# Patient Record
Sex: MALE | Race: BLACK OR AFRICAN AMERICAN | URBAN - METROPOLITAN AREA
[De-identification: names, ages, dates, MRNs, and addresses within clinical notes are randomized per-mention and may not be internally consistent; named-entity substitution may affect disease eponyms.]

---

## 2024-11-11 ENCOUNTER — LAB ENCOUNTER (OUTPATIENT)
Dept: LAB | Age: 29
End: 2024-11-11
Attending: INTERNAL MEDICINE
Payer: COMMERCIAL

## 2024-11-11 DIAGNOSIS — Z13.0 SCREENING FOR ENDOCRINE, NUTRITIONAL, METABOLIC AND IMMUNITY DISORDER: Primary | ICD-10-CM

## 2024-11-11 DIAGNOSIS — E55.9 VITAMIN D DEFICIENCY: ICD-10-CM

## 2024-11-11 DIAGNOSIS — Z13.29 SCREENING FOR ENDOCRINE, NUTRITIONAL, METABOLIC AND IMMUNITY DISORDER: Primary | ICD-10-CM

## 2024-11-11 DIAGNOSIS — Z13.228 SCREENING FOR ENDOCRINE, NUTRITIONAL, METABOLIC AND IMMUNITY DISORDER: Primary | ICD-10-CM

## 2024-11-11 DIAGNOSIS — Z13.21 SCREENING FOR ENDOCRINE, NUTRITIONAL, METABOLIC AND IMMUNITY DISORDER: Primary | ICD-10-CM

## 2024-11-11 LAB
ALBUMIN SERPL-MCNC: 4.8 G/DL (ref 3.2–4.8)
ALBUMIN/GLOB SERPL: 1.5 {RATIO} (ref 1–2)
ALP LIVER SERPL-CCNC: 97 U/L
ALT SERPL-CCNC: 35 U/L
ANION GAP SERPL CALC-SCNC: 10 MMOL/L (ref 0–18)
AST SERPL-CCNC: 19 U/L (ref ?–34)
BILIRUB SERPL-MCNC: 0.6 MG/DL (ref 0.3–1.2)
BUN BLD-MCNC: 11 MG/DL (ref 9–23)
BUN/CREAT SERPL: 13.8 (ref 10–20)
CALCIUM BLD-MCNC: 10.2 MG/DL (ref 8.7–10.4)
CHLORIDE SERPL-SCNC: 104 MMOL/L (ref 98–112)
CHOLEST SERPL-MCNC: 183 MG/DL (ref ?–200)
CO2 SERPL-SCNC: 26 MMOL/L (ref 21–32)
CREAT BLD-MCNC: 0.8 MG/DL
DEPRECATED RDW RBC AUTO: 34.7 FL (ref 35.1–46.3)
EGFRCR SERPLBLD CKD-EPI 2021: 122 ML/MIN/1.73M2 (ref 60–?)
ERYTHROCYTE [DISTWIDTH] IN BLOOD BY AUTOMATED COUNT: 12.3 % (ref 11–15)
EST. AVERAGE GLUCOSE BLD GHB EST-MCNC: 235 MG/DL (ref 68–126)
FASTING PATIENT LIPID ANSWER: NO
FASTING STATUS PATIENT QL REPORTED: NO
GLOBULIN PLAS-MCNC: 3.2 G/DL (ref 2–3.5)
GLUCOSE BLD-MCNC: 257 MG/DL (ref 70–99)
HBA1C MFR BLD: 9.8 % (ref ?–5.7)
HCT VFR BLD AUTO: 45.2 %
HDLC SERPL-MCNC: 34 MG/DL (ref 40–59)
HGB BLD-MCNC: 14 G/DL
LDLC SERPL CALC-MCNC: 107 MG/DL (ref ?–100)
MCH RBC QN AUTO: 24.4 PG (ref 26–34)
MCHC RBC AUTO-ENTMCNC: 31 G/DL (ref 31–37)
MCV RBC AUTO: 78.9 FL
NONHDLC SERPL-MCNC: 149 MG/DL (ref ?–130)
OSMOLALITY SERPL CALC.SUM OF ELEC: 298 MOSM/KG (ref 275–295)
PLATELET # BLD AUTO: 368 10(3)UL (ref 150–450)
POTASSIUM SERPL-SCNC: 4 MMOL/L (ref 3.5–5.1)
PROT SERPL-MCNC: 8 G/DL (ref 5.7–8.2)
RBC # BLD AUTO: 5.73 X10(6)UL
SODIUM SERPL-SCNC: 140 MMOL/L (ref 136–145)
TRIGL SERPL-MCNC: 243 MG/DL (ref 30–149)
TSI SER-ACNC: 1.47 UIU/ML (ref 0.55–4.78)
VIT D+METAB SERPL-MCNC: 14.9 NG/ML (ref 30–100)
VLDLC SERPL CALC-MCNC: 42 MG/DL (ref 0–30)
WBC # BLD AUTO: 7.7 X10(3) UL (ref 4–11)

## 2024-11-11 PROCEDURE — 80053 COMPREHEN METABOLIC PANEL: CPT | Performed by: INTERNAL MEDICINE

## 2024-11-11 PROCEDURE — 84466 ASSAY OF TRANSFERRIN: CPT | Performed by: INTERNAL MEDICINE

## 2024-11-11 PROCEDURE — 36415 COLL VENOUS BLD VENIPUNCTURE: CPT | Performed by: INTERNAL MEDICINE

## 2024-11-11 PROCEDURE — 84443 ASSAY THYROID STIM HORMONE: CPT | Performed by: INTERNAL MEDICINE

## 2024-11-11 PROCEDURE — 83036 HEMOGLOBIN GLYCOSYLATED A1C: CPT | Performed by: INTERNAL MEDICINE

## 2024-11-11 PROCEDURE — 83540 ASSAY OF IRON: CPT | Performed by: INTERNAL MEDICINE

## 2024-11-11 PROCEDURE — 85027 COMPLETE CBC AUTOMATED: CPT | Performed by: INTERNAL MEDICINE

## 2024-11-11 PROCEDURE — 82306 VITAMIN D 25 HYDROXY: CPT | Performed by: INTERNAL MEDICINE

## 2024-11-11 PROCEDURE — 80061 LIPID PANEL: CPT | Performed by: INTERNAL MEDICINE

## 2024-11-11 PROCEDURE — 82728 ASSAY OF FERRITIN: CPT | Performed by: INTERNAL MEDICINE

## 2024-11-12 ENCOUNTER — TELEPHONE (OUTPATIENT)
Facility: LOCATION | Age: 29
End: 2024-11-12

## 2024-11-12 ENCOUNTER — OFFICE VISIT (OUTPATIENT)
Facility: LOCATION | Age: 29
End: 2024-11-12

## 2024-11-12 VITALS
HEIGHT: 74 IN | BODY MASS INDEX: 36.83 KG/M2 | DIASTOLIC BLOOD PRESSURE: 68 MMHG | WEIGHT: 287 LBS | SYSTOLIC BLOOD PRESSURE: 142 MMHG | OXYGEN SATURATION: 98 % | HEART RATE: 78 BPM

## 2024-11-12 DIAGNOSIS — Z53.20 STATIN MEDICATION DECLINED BY PATIENT: ICD-10-CM

## 2024-11-12 DIAGNOSIS — R71.8 LOW MEAN CORPUSCULAR VOLUME (MCV): ICD-10-CM

## 2024-11-12 DIAGNOSIS — E11.9 TYPE 2 DIABETES MELLITUS WITHOUT COMPLICATION, WITHOUT LONG-TERM CURRENT USE OF INSULIN (HCC): ICD-10-CM

## 2024-11-12 DIAGNOSIS — R06.81 WITNESSED EPISODE OF APNEA: ICD-10-CM

## 2024-11-12 DIAGNOSIS — E11.59 HYPERTENSION ASSOCIATED WITH TYPE 2 DIABETES MELLITUS (HCC): ICD-10-CM

## 2024-11-12 DIAGNOSIS — E11.69 HYPERLIPIDEMIA ASSOCIATED WITH TYPE 2 DIABETES MELLITUS (HCC): ICD-10-CM

## 2024-11-12 DIAGNOSIS — I15.2 HYPERTENSION ASSOCIATED WITH TYPE 2 DIABETES MELLITUS (HCC): ICD-10-CM

## 2024-11-12 DIAGNOSIS — Z00.00 ANNUAL PHYSICAL EXAM: Primary | ICD-10-CM

## 2024-11-12 DIAGNOSIS — E78.5 HYPERLIPIDEMIA ASSOCIATED WITH TYPE 2 DIABETES MELLITUS (HCC): ICD-10-CM

## 2024-11-12 DIAGNOSIS — J45.41 MODERATE PERSISTENT ASTHMA WITH ACUTE EXACERBATION (HCC): ICD-10-CM

## 2024-11-12 PROBLEM — J45.40 MODERATE PERSISTENT ASTHMA WITHOUT COMPLICATION (HCC): Status: ACTIVE | Noted: 2024-11-12

## 2024-11-12 PROBLEM — M79.10 MYALGIA: Status: ACTIVE | Noted: 2024-11-12

## 2024-11-12 PROBLEM — R79.89 ELEVATED FERRITIN: Status: ACTIVE | Noted: 2024-11-12

## 2024-11-12 LAB
DEPRECATED HBV CORE AB SER IA-ACNC: 423 NG/ML
IRON SATN MFR SERPL: 33 %
IRON SERPL-MCNC: 128 UG/DL
TIBC SERPL-MCNC: 387 UG/DL (ref 250–425)
TRANSFERRIN SERPL-MCNC: 260 MG/DL (ref 215–365)

## 2024-11-12 PROCEDURE — 3078F DIAST BP <80 MM HG: CPT | Performed by: INTERNAL MEDICINE

## 2024-11-12 PROCEDURE — 3077F SYST BP >= 140 MM HG: CPT | Performed by: INTERNAL MEDICINE

## 2024-11-12 PROCEDURE — 3046F HEMOGLOBIN A1C LEVEL >9.0%: CPT | Performed by: INTERNAL MEDICINE

## 2024-11-12 PROCEDURE — 3008F BODY MASS INDEX DOCD: CPT | Performed by: INTERNAL MEDICINE

## 2024-11-12 PROCEDURE — 99385 PREV VISIT NEW AGE 18-39: CPT | Performed by: INTERNAL MEDICINE

## 2024-11-12 PROCEDURE — 99204 OFFICE O/P NEW MOD 45 MIN: CPT | Performed by: INTERNAL MEDICINE

## 2024-11-12 RX ORDER — TIRZEPATIDE 2.5 MG/.5ML
2.5 INJECTION, SOLUTION SUBCUTANEOUS WEEKLY
Qty: 2 ML | Refills: 1 | Status: SHIPPED | OUTPATIENT
Start: 2024-11-12 | End: 2024-11-14

## 2024-11-12 RX ORDER — FLUTICASONE PROPIONATE AND SALMETEROL 250; 50 UG/1; UG/1
1 POWDER RESPIRATORY (INHALATION) 2 TIMES DAILY
Qty: 3 EACH | Refills: 6 | Status: SHIPPED | OUTPATIENT
Start: 2024-11-12

## 2024-11-12 RX ORDER — TELMISARTAN 20 MG/1
20 TABLET ORAL NIGHTLY
Qty: 90 TABLET | Refills: 5 | Status: SHIPPED | OUTPATIENT
Start: 2024-11-12

## 2024-11-12 RX ORDER — TIRZEPATIDE 7.5 MG/.5ML
7.5 INJECTION, SOLUTION SUBCUTANEOUS WEEKLY
Qty: 2 ML | Refills: 1 | Status: SHIPPED | OUTPATIENT
Start: 2024-11-12

## 2024-11-12 RX ORDER — TIRZEPATIDE 5 MG/.5ML
5 INJECTION, SOLUTION SUBCUTANEOUS WEEKLY
Qty: 2 ML | Refills: 1 | Status: SHIPPED | OUTPATIENT
Start: 2024-11-12

## 2024-11-12 RX ORDER — METHYLPREDNISOLONE 4 MG/1
TABLET ORAL
Qty: 1 EACH | Refills: 0 | Status: SHIPPED | OUTPATIENT
Start: 2024-11-12

## 2024-11-12 RX ORDER — METFORMIN HYDROCHLORIDE 500 MG/1
1000 TABLET, EXTENDED RELEASE ORAL 2 TIMES DAILY WITH MEALS
Qty: 360 TABLET | Refills: 0 | Status: SHIPPED | OUTPATIENT
Start: 2024-11-12 | End: 2025-02-10

## 2024-11-12 RX ORDER — ROSUVASTATIN CALCIUM 5 MG/1
5 TABLET, COATED ORAL NIGHTLY
Qty: 90 TABLET | Refills: 6 | Status: SHIPPED | OUTPATIENT
Start: 2024-11-12 | End: 2024-11-12

## 2024-11-12 NOTE — PROGRESS NOTES
INTERNAL MEDICINE ANNUAL EXAM NOTE     Patient ID: Deirdre Lewis is a 29 year old male.  Chief Complaint: Physical      Deirdre Lewis is a pleasant 29 year old male who presents for annual physical exam. Deirdre Lewis is doing well today.  1.  He has type 2 diabetes, his most recent A1c is elevated at 9.8, he is currently on metformin and Jardiance.  2.  He has hypertension associate with type 2 diabetes, he is not currently on any medications, we discussed starting telmisartan.  3.  He has dyslipidemia associated type 2 diabetes, discussed the importance of primary prevention, with that being said he is in the process of having children and there are reports that cholesterol medications can alter sperm quality therefore he elects not to start a cholesterol medication at this time.  4.  He has been having some wheeze and cough, this started after an upper respiratory illness a few weeks ago.  He states his symptoms are worse at night.  He does not have any fevers.  There is no mucus but he feels like mucus is coming up.  5.  He states that he snores heavily at night, his wife states that he stops breathing at night, he often wakes up fatigued and with headaches.  6.  His labs showed low MCV, unclear if this is a thalassemia or if this is truly iron deficiency, he does feel some fatigue.        Health Maintenance  - All care gaps addressed with patient.     Review of Systems  Review of Systems   Constitutional:  Negative for unexpected weight change.   HENT:  Negative for trouble swallowing.    Eyes:  Negative for pain.   Cardiovascular:  Negative for chest pain.   Gastrointestinal:  Negative for abdominal pain.   Genitourinary:  Negative for dysuria.   Neurological: Negative.    Psychiatric/Behavioral: Negative.         Physical Exam  Vitals:    11/12/24 0822   BP: 142/68   Pulse: 78   SpO2: 98%   Weight: 287 lb (130.2 kg)   Height: 6' 2\" (1.88 m)     Body mass index is 36.85 kg/m².  BP Readings from Last 3 Encounters:    11/12/24 142/68     Physical Exam  Vitals and nursing note reviewed.   Constitutional:       General: He is not in acute distress.     Appearance: Normal appearance.   HENT:      Head: Normocephalic.      Right Ear: External ear normal.      Left Ear: External ear normal.   Eyes:      Extraocular Movements: Extraocular movements intact.      Conjunctiva/sclera: Conjunctivae normal.   Cardiovascular:      Rate and Rhythm: Normal rate and regular rhythm.      Pulses: Normal pulses.      Heart sounds: Normal heart sounds.   Pulmonary:      Effort: Pulmonary effort is normal. No respiratory distress.      Breath sounds: Normal breath sounds. No wheezing.   Abdominal:      General: Abdomen is flat. Bowel sounds are normal.      Tenderness: There is no abdominal tenderness.   Musculoskeletal:         General: Normal range of motion.      Cervical back: Normal range of motion and neck supple.   Skin:     Coloration: Skin is not jaundiced.   Neurological:      General: No focal deficit present.      Mental Status: He is alert and oriented to person, place, and time. Mental status is at baseline.   Psychiatric:         Mood and Affect: Mood normal.         Behavior: Behavior normal.           Labs & Imaging  Pertinent labs and imaging reviewed.   Lab Results   Component Value Date     (H) 11/11/2024    BUN 11 11/11/2024    BUNCREA 13.8 11/11/2024    CREATSERUM 0.80 11/11/2024    ANIONGAP 10 11/11/2024    CA 10.2 11/11/2024    OSMOCALC 298 (H) 11/11/2024    ALKPHO 97 11/11/2024    AST 19 11/11/2024    ALT 35 11/11/2024    BILT 0.6 11/11/2024    TP 8.0 11/11/2024    ALB 4.8 11/11/2024    GLOBULIN 3.2 11/11/2024     11/11/2024    K 4.0 11/11/2024     11/11/2024    CO2 26.0 11/11/2024     Lab Results   Component Value Date     (H) 11/11/2024    A1C 9.8 (H) 11/11/2024     Lab Results   Component Value Date    WBC 7.7 11/11/2024    RBC 5.73 (H) 11/11/2024    HGB 14.0 11/11/2024    HCT 45.2 11/11/2024     MCV 78.9 (L) 11/11/2024    MCH 24.4 (L) 11/11/2024    MCHC 31.0 11/11/2024    RDW 12.3 11/11/2024    .0 11/11/2024     Lab Results   Component Value Date    CHOLEST 183 11/11/2024    TRIG 243 (H) 11/11/2024    HDL 34 (L) 11/11/2024     (H) 11/11/2024    VLDL 42 (H) 11/11/2024    NONHDLC 149 (H) 11/11/2024     The ASCVD Risk score (Juventino JAIN, et al., 2019) failed to calculate for the following reasons:    The 2019 ASCVD risk score is only valid for ages 40 to 79    Medical History    Reviewed allergies:  Allergies[1]     Reviewed:  Patient Active Problem List    Diagnosis    Type 2 diabetes mellitus without complication, without long-term current use of insulin (HCC)    Hypertension associated with type 2 diabetes mellitus (HCC)    Hyperlipidemia associated with type 2 diabetes mellitus (HCC)    Moderate persistent asthma without complication (HCC)    Statin medication declined by patient    Myalgia      Reviewed:  History reviewed. No pertinent past medical history.   Reviewed:  History reviewed. No pertinent family history.    Reviewed:  History reviewed. No pertinent surgical history.   Reviewed:  Social History     Socioeconomic History    Marital status:    Tobacco Use    Smoking status: Never    Smokeless tobacco: Never     Social Drivers of Health     Financial Resource Strain: Not on File (4/2/2024)    Received from [facility]    Financial Resource Strain     Financial Resource Strain: 0   Food Insecurity: Not on File (9/26/2024)    Received from [facility]    Food Insecurity     Food: 0   Transportation Needs: Not on File (4/2/2024)    Received from [facility]    Transportation Needs     Transportation: 0   Physical Activity: Not on File (4/2/2024)    Received from [facility]    Physical Activity     Physical Activity: 0   Stress: Not on File (4/2/2024)    Received from [facility]    Stress     Stress: 0   Social Connections: Not on File (9/16/2024)    Received from [facility]    Social Connections     Connectedness:  0   Housing Stability: Not on File (2024)    Received from [facility]     Housin      Reviewed:  Current Outpatient Medications   Medication Sig Dispense Refill    empagliflozin (JARDIANCE) 25 MG Oral Tab Take 1 tablet (25 mg total) by mouth daily. FOR DIABETES. 90 tablet 9    metFORMIN  MG Oral Tablet 24 Hr Take 2 tablets (1,000 mg total) by mouth 2 (two) times daily with meals. 360 tablet 0    Tirzepatide (MOUNJARO) 2.5 MG/0.5ML Subcutaneous Solution Auto-injector Inject 2.5 mg into the skin once a week. If doing well after 4 doses then increase to 5mg. 2 mL 1    Tirzepatide (MOUNJARO) 5 MG/0.5ML Subcutaneous Solution Auto-injector Inject 5 mg into the skin once a week. If doing well after 4 doses then increase to 7.5mg. 2 mL 1    Tirzepatide (MOUNJARO) 7.5 MG/0.5ML Subcutaneous Solution Auto-injector Inject 7.5 mg into the skin once a week. See me back for refills/labs. 2 mL 1    Telmisartan 20 MG Oral Tab Take 1 tablet (20 mg total) by mouth at bedtime. FOR BLOOD PRESSURE. STOP LOSARTAN. 90 tablet 5    fluticasone-salmeterol (ADVAIR DISKUS) 250-50 MCG/ACT Inhalation Aerosol Powder, Breath Activated Inhale 1 puff into the lungs 2 (two) times daily. 3 each 6    methylPREDNISolone (MEDROL) 4 MG Oral Tablet Therapy Pack As directed if symptoms fail to improve despite inhaler. 1 each 0          Assessment & Plan    1. Annual physical exam    2. Type 2 diabetes mellitus without complication, without long-term current use of insulin (HCC)  - empagliflozin (JARDIANCE) 25 MG Oral Tab; Take 1 tablet (25 mg total) by mouth daily. FOR DIABETES.  Dispense: 90 tablet; Refill: 9  - metFORMIN  MG Oral Tablet 24 Hr; Take 2 tablets (1,000 mg total) by mouth 2 (two) times daily with meals.  Dispense: 360 tablet; Refill: 0  - Tirzepatide (MOUNJARO) 2.5 MG/0.5ML Subcutaneous Solution Auto-injector; Inject 2.5 mg into the skin once a week. If doing well after 4 doses then increase to 5mg.   Dispense: 2 mL; Refill: 1  - Tirzepatide (MOUNJARO) 5 MG/0.5ML Subcutaneous Solution Auto-injector; Inject 5 mg into the skin once a week. If doing well after 4 doses then increase to 7.5mg.  Dispense: 2 mL; Refill: 1  - Tirzepatide (MOUNJARO) 7.5 MG/0.5ML Subcutaneous Solution Auto-injector; Inject 7.5 mg into the skin once a week. See me back for refills/labs.  Dispense: 2 mL; Refill: 1  - CMP in 3 months; Future  - Lipid in 3 months; Future  - Hemoglobin A1C in 3 months; Future  - Microalb/Creat Ratio, Random Urine in 3 months; Future    3. Hypertension associated with type 2 diabetes mellitus (HCC)  - Telmisartan 20 MG Oral Tab; Take 1 tablet (20 mg total) by mouth at bedtime. FOR BLOOD PRESSURE. STOP LOSARTAN.  Dispense: 90 tablet; Refill: 5    4. Hyperlipidemia associated with type 2 diabetes mellitus (HCC)    5. Low mean corpuscular volume (MCV)  - Ferritin; Future  - Iron And Tibc; Future    6. Witnessed episode of apnea  - Home Sleep Apnea Test (Adult pt only) - Sleep consult required for Medicare pts  - General sleep study; Future    7. Moderate persistent asthma with acute exacerbation (HCC)  - fluticasone-salmeterol (ADVAIR DISKUS) 250-50 MCG/ACT Inhalation Aerosol Powder, Breath Activated; Inhale 1 puff into the lungs 2 (two) times daily.  Dispense: 3 each; Refill: 6  - methylPREDNISolone (MEDROL) 4 MG Oral Tablet Therapy Pack; As directed if symptoms fail to improve despite inhaler.  Dispense: 1 each; Refill: 0    8. Statin medication declined by patient  Plan  Overall doing well today.  In terms of the asthma we will start him on Advair 2 puffs twice daily for the next week and if he has resolution no need for steroids but if he has persistence then add in the Medrol Dosepak.  Instructions in AVS.  We will start him on telmisartan for renal protection and for blood pressure.  Ideally he would benefit from cholesterol medication because of the diabetes but he is not family-planning stage therefore we  will hold off for now and he elects not to take this.  Increase the Jardiance from 10 to 25 mg, continue with metformin higher doses made him uncomfortable and with nausea, add in Mounjaro and he will titrate up.  I would like for him to repeat labs in about 3 months but we will plan to follow-up in about 1 month to check his response to the above medications.      Follow Up:   Return for 1 month video for mounjaro follow up; 3 months video/office for a1c- get labs 3 days before visit. .      Flakito Villegas MD  Internal Medicine      Patient asked to sign release of information for outside records if not already requested, make future office/imaging appointments at the  prior to leaving, and to sign up for [employer] if not already active.  Preventive measures and further education discussed with patient as per after visit summary. Potential medication side effects discussed. All questions answered to best of ability.   Call office with any questions. Seek emergency care if necessary.   Patient understands and agrees to follow directions and advice.      ----------------------------------------- PATIENT INSTRUCTIONS-----------------------------------------     Patient Instructions   Frequently asked questions about GLP-1 Medications- ZEPBOUND/WEGOVY  Eat smaller, more frequent, low carb meals to reduce side effects. In other words: eat like a diabetic! Mediterranean diet is excellent. For each meal eat fat and proteins first, then any remaining carbs- this reduces blood sugar spikes and weight gain.   IF you do not eat like a diabetic you will not lose weight and will have significant stomach side effects.  You must perform weight lifting exercises at least 3-4 times per week to ensure you build muscle. Slow repetitions to avoid injury and aim for 20-30 minutes per session of activity.   These medications make you lose fat AND muscle. The only way to come off these medications is to replace your fat with  muscle. It's not enough to just lose fat. Studies consistently show weight lifting can cure diabetes, cardiovascular exercise like walking DOES NOT. The same concept applies to weight loss.   You should eat 30-40 grams of protein daily- LEVELS protein is excellent- in addition to your meals. Can be found on [employer] If you see a kidney specialist then please discuss your protein intake with them.  Every 4 injections we should aim to increase the dose of the medication UNLESS you are having side effects or you are continuing to lose weight, then we stay at this dose for a total of 8 doses and then we re-challenge with the higher dose.  If you are losing weight but not having side effects you may remain on your current dose until your weight loss plateus, then we increase the dose.   If you are NOT losing weight AND are having side effects, continue your current dose for a total of 8 doses before we increase;  if symptoms are severe stop medication and contact my office.   You may remain on any dose indefinitely and have positive effects, but you should NOT stay on a dose that is not providing any benefit (weight loss) nor side effects (nausea/ upset stomach).   Please see me back via video visit for any questions or concerns related to increasing your dose, typically every 4-8 weeks.   Our plan is to have 1 year of medication or less!      1.  Continue with the Jardiance but we will increase this to the maximum 25 mg and for now go ahead and continue the metformin that you have at home however once you  the Mounjaro start taking it and as we start to get into the higher dosing our first goal is to simply stop the metformin.  The combination of Mounjaro plus the Jardiance will not cause low blood sugar as long as you are eating, you do not need to check your blood sugars unless you are feeling unwell, and they do not produce any major side effects as long as you are doing the above.  People who have side  effects on Mounjaro are not following a diabetic diet and otherwise they are eating lots of carbohydrates.  Weight lifting is going to be your best friend.    2.  Stop the losartan and we will switch this to telmisartan  3.  Lets place you on a cholesterol medication called rosuvastatin 5mg, this will help protect your blood vessels against the damage from diabetes and once the diabetes is controlled this is one of the first medications we can take away because you are not of 40 years of age.    4.  I will add in some iron studies to the labs you just rafa, you do not need to do anything until I tell you otherwise.    All of your medications have been sent with extended refills so please do not stop them, I will explicitly tell you if medications need to be stopped.    5. your vitamin D level is low. Please start taking over-the-counter vitamin D3 5000 IU daily with meals to help replace and maintain your Vitamin D level.     6.  For the breathing I would like for you to start Advair which is a purple Diskus, the instructions are 1 puff twice a day which is maintenance but I would like for you to do the treatment dose which is 2 puffs twice a day for 5 to 7 days and this should resolve your symptoms and then you may use the inhaler as needed.  If you feel better taking it daily you may go ahead and do so there are no side effects.  These inhalers are both treatment and prevention.  The number of puffs you take provides more treatment.  -With that being said if you take this twice a day for 5 to 7 days but you notice your symptoms have not resolved then I am going to give you a Medrol Dosepak and I would like for you to start that continue the 2 puffs twice a day until you are finished with that Medrol Dosepak and then go down to 1 puff twice a day for another 30 days.  Then let me know how you feel.             [1]   Allergies  Allergen Reactions    Dander HIVES

## 2024-11-12 NOTE — PATIENT INSTRUCTIONS
Frequently asked questions about GLP-1 Medications- ZEPBOUND/WEGOVY  Eat smaller, more frequent, low carb meals to reduce side effects. In other words: eat like a diabetic! Mediterranean diet is excellent. For each meal eat fat and proteins first, then any remaining carbs- this reduces blood sugar spikes and weight gain.   IF you do not eat like a diabetic you will not lose weight and will have significant stomach side effects.  You must perform weight lifting exercises at least 3-4 times per week to ensure you build muscle. Slow repetitions to avoid injury and aim for 20-30 minutes per session of activity.   These medications make you lose fat AND muscle. The only way to come off these medications is to replace your fat with muscle. It's not enough to just lose fat. Studies consistently show weight lifting can cure diabetes, cardiovascular exercise like walking DOES NOT. The same concept applies to weight loss.   You should eat 30-40 grams of protein daily- LEVELS protein is excellent- in addition to your meals. Can be found on [employer]. If you see a kidney specialist then please discuss your protein intake with them.  Every 4 injections we should aim to increase the dose of the medication UNLESS you are having side effects or you are continuing to lose weight, then we stay at this dose for a total of 8 doses and then we re-challenge with the higher dose.  If you are losing weight but not having side effects you may remain on your current dose until your weight loss plateus, then we increase the dose.   If you are NOT losing weight AND are having side effects, continue your current dose for a total of 8 doses before we increase;  if symptoms are severe stop medication and contact my office.   You may remain on any dose indefinitely and have positive effects, but you should NOT stay on a dose that is not providing any benefit (weight loss) nor side effects (nausea/ upset stomach).   Please see me back via video  visit for any questions or concerns related to increasing your dose, typically every 4-8 weeks.   Our plan is to have 1 year of medication or less!      1.  Continue with the Jardiance but we will increase this to the maximum 25 mg and for now go ahead and continue the metformin that you have at home however once you  the Mounjaro start taking it and as we start to get into the higher dosing our first goal is to simply stop the metformin.  The combination of Mounjaro plus the Jardiance will not cause low blood sugar as long as you are eating, you do not need to check your blood sugars unless you are feeling unwell, and they do not produce any major side effects as long as you are doing the above.  People who have side effects on Mounjaro are not following a diabetic diet and otherwise they are eating lots of carbohydrates.  Weight lifting is going to be your best friend.    2.  Stop the losartan and we will switch this to telmisartan  3.  Lets place you on a cholesterol medication called rosuvastatin 5mg, this will help protect your blood vessels against the damage from diabetes and once the diabetes is controlled this is one of the first medications we can take away because you are not of 40 years of age.    4.  I will add in some iron studies to the labs you just rafa, you do not need to do anything until I tell you otherwise.    All of your medications have been sent with extended refills so please do not stop them, I will explicitly tell you if medications need to be stopped.    5. your vitamin D level is low. Please start taking over-the-counter vitamin D3 5000 IU daily with meals to help replace and maintain your Vitamin D level.     6.  For the breathing I would like for you to start Advair which is a purple Diskus, the instructions are 1 puff twice a day which is maintenance but I would like for you to do the treatment dose which is 2 puffs twice a day for 5 to 7 days and this should resolve your  symptoms and then you may use the inhaler as needed.  If you feel better taking it daily you may go ahead and do so there are no side effects.  These inhalers are both treatment and prevention.  The number of puffs you take provides more treatment.  -With that being said if you take this twice a day for 5 to 7 days but you notice your symptoms have not resolved then I am going to give you a Medrol Dosepak and I would like for you to start that continue the 2 puffs twice a day until you are finished with that Medrol Dosepak and then go down to 1 puff twice a day for another 30 days.  Then let me know how you feel.

## 2024-11-14 DIAGNOSIS — E11.9 TYPE 2 DIABETES MELLITUS WITHOUT COMPLICATION, WITHOUT LONG-TERM CURRENT USE OF INSULIN (HCC): ICD-10-CM

## 2024-11-14 RX ORDER — TIRZEPATIDE 2.5 MG/.5ML
2.5 INJECTION, SOLUTION SUBCUTANEOUS WEEKLY
Qty: 2 ML | Refills: 1 | Status: SHIPPED | OUTPATIENT
Start: 2024-11-14

## 2024-11-23 ENCOUNTER — LAB ENCOUNTER (OUTPATIENT)
Dept: LAB | Age: 29
End: 2024-11-23
Attending: INTERNAL MEDICINE
Payer: COMMERCIAL

## 2024-11-23 DIAGNOSIS — R79.89 ELEVATED FERRITIN: ICD-10-CM

## 2024-11-23 PROCEDURE — 81256 HFE GENE: CPT

## 2024-11-23 PROCEDURE — 36415 COLL VENOUS BLD VENIPUNCTURE: CPT

## 2024-12-08 ENCOUNTER — OFFICE VISIT (OUTPATIENT)
Dept: SLEEP CENTER | Age: 29
End: 2024-12-08
Attending: INTERNAL MEDICINE
Payer: COMMERCIAL

## 2024-12-08 DIAGNOSIS — R06.81 WITNESSED EPISODE OF APNEA: ICD-10-CM

## 2024-12-08 PROCEDURE — 95806 SLEEP STUDY UNATT&RESP EFFT: CPT

## 2024-12-09 DIAGNOSIS — E11.9 TYPE 2 DIABETES MELLITUS WITHOUT COMPLICATION, WITHOUT LONG-TERM CURRENT USE OF INSULIN (HCC): ICD-10-CM

## 2024-12-12 ENCOUNTER — SLEEP STUDY (OUTPATIENT)
Facility: CLINIC | Age: 29
End: 2024-12-12
Payer: MEDICAID

## 2024-12-12 DIAGNOSIS — G47.30 SLEEP APNEA, UNSPECIFIED TYPE: Primary | ICD-10-CM

## 2024-12-12 PROCEDURE — 95806 SLEEP STUDY UNATT&RESP EFFT: CPT | Performed by: INTERNAL MEDICINE

## 2024-12-13 RX ORDER — TIRZEPATIDE 5 MG/.5ML
5 INJECTION, SOLUTION SUBCUTANEOUS WEEKLY
Qty: 2 ML | Refills: 1 | OUTPATIENT
Start: 2024-12-13

## 2025-01-07 DIAGNOSIS — E11.9 TYPE 2 DIABETES MELLITUS WITHOUT COMPLICATION, WITHOUT LONG-TERM CURRENT USE OF INSULIN (HCC): ICD-10-CM

## 2025-01-07 DIAGNOSIS — I15.2 HYPERTENSION ASSOCIATED WITH TYPE 2 DIABETES MELLITUS (HCC): ICD-10-CM

## 2025-01-07 DIAGNOSIS — E11.59 HYPERTENSION ASSOCIATED WITH TYPE 2 DIABETES MELLITUS (HCC): ICD-10-CM

## 2025-01-09 RX ORDER — TIRZEPATIDE 7.5 MG/.5ML
7.5 INJECTION, SOLUTION SUBCUTANEOUS WEEKLY
Qty: 2 ML | Refills: 1 | Status: SHIPPED | OUTPATIENT
Start: 2025-01-09

## 2025-01-09 RX ORDER — TELMISARTAN 20 MG/1
20 TABLET ORAL NIGHTLY
Qty: 90 TABLET | Refills: 5 | Status: SHIPPED | OUTPATIENT
Start: 2025-01-09

## 2025-02-06 DIAGNOSIS — E11.9 TYPE 2 DIABETES MELLITUS WITHOUT COMPLICATION, WITHOUT LONG-TERM CURRENT USE OF INSULIN (HCC): ICD-10-CM

## 2025-02-10 RX ORDER — TIRZEPATIDE 7.5 MG/.5ML
7.5 INJECTION, SOLUTION SUBCUTANEOUS WEEKLY
Qty: 2 ML | Refills: 1 | Status: SHIPPED | OUTPATIENT
Start: 2025-02-10

## 2025-02-10 NOTE — TELEPHONE ENCOUNTER
Please review. Protocol Failed; No Protocol    Future Appointments   Date Time Provider Department Center   2/17/2025 11:00 AM [facility]   2/17/2025  1:20 PM [facility]         Requested Prescriptions   Pending Prescriptions Disp Refills    Tirzepatide (MOUNJARO) 7.5 MG/0.5ML Subcutaneous Solution Auto-injector 2 mL 1     Sig: Inject 7.5 mg into the skin once a week. See me back for refills/labs.       Diabetes Medication Protocol Failed - 2/10/2025 10:39 AM        Failed - Last A1C < 7.5 and within past 6 months     Lab Results   Component Value Date    A1C 9.8 (H) 11/11/2024             Passed - In person appointment or virtual visit in the past 6 mos or appointment in next 3 mos     Recent Outpatient Visits              2 months ago [facility]ed episode of apnea    [facility]    Office Visit    3 months ago Annual physical exam    [facility] Flakito Villegas MD    Office Visit          Future Appointments         Provider Department Appt Notes    In 1 week Cristino Nuñez MD [facility] [place]     In 1 week Flakito Villegas MD [facility] f/u                    Passed - Microalbumin procedure in past 12 months or taking ACE/ARB        Passed - EGFRCR or GFRAA > 50     GFR Evaluation  EGFRCR: 122 , resulted on 11/11/2024          Passed - GFR in the past 12 months        Passed - Medication is active on med list               Future Appointments         Provider Department Appt Notes    In 1 week Cristino Nuñez MD [facility] [place]     In 1 week Flakito Villegas MD [facility] f/u          Recent Outpatient Visits              2 months ago Witnessed episode of apnea    [facility]    Office Visit    3 months ago Annual physical exam    [facility] Flakito Villegas MD    Office Visit

## 2025-02-10 NOTE — TELEPHONE ENCOUNTER
Patient called requesting an update on the following medication as the patient states they no longer have medicine left:     Medication Quantity Refills Start End   Tirzepatide (MOUNJARO) 7.5 MG/0.5ML Subcutaneous Solution Auto-injector 2 mL 1 1/9/2025 --   Sig:   Inject 7.5 mg into the skin once a week. See me back for refills/labs.     Route:   Subcutaneous     Order #:   [id number]          Pharmacy:   [facility] [address] [place] [address] [id number] AT [facility], [phone], [phone]

## 2025-02-14 ENCOUNTER — TELEPHONE (OUTPATIENT)
Facility: LOCATION | Age: 30
End: 2025-02-14

## 2025-02-14 NOTE — TELEPHONE ENCOUNTER
called pt & left vmail to ash appt w/NP Rose Pizano or another provider. Dr Villegas is no longer with the practice

## 2025-02-15 DIAGNOSIS — E11.9 TYPE 2 DIABETES MELLITUS WITHOUT COMPLICATION, WITHOUT LONG-TERM CURRENT USE OF INSULIN (HCC): ICD-10-CM

## 2025-02-17 ENCOUNTER — LAB ENCOUNTER (OUTPATIENT)
Dept: LAB | Age: 30
End: 2025-02-17
Attending: INTERNAL MEDICINE
Payer: COMMERCIAL

## 2025-02-17 ENCOUNTER — OFFICE VISIT (OUTPATIENT)
Facility: LOCATION | Age: 30
End: 2025-02-17
Payer: COMMERCIAL

## 2025-02-17 VITALS
HEART RATE: 98 BPM | DIASTOLIC BLOOD PRESSURE: 81 MMHG | BODY MASS INDEX: 35.53 KG/M2 | RESPIRATION RATE: 18 BRPM | OXYGEN SATURATION: 97 % | WEIGHT: 276.81 LBS | SYSTOLIC BLOOD PRESSURE: 149 MMHG | HEIGHT: 74 IN

## 2025-02-17 DIAGNOSIS — I15.2 HYPERTENSION ASSOCIATED WITH TYPE 2 DIABETES MELLITUS (HCC): ICD-10-CM

## 2025-02-17 DIAGNOSIS — E11.9 TYPE 2 DIABETES MELLITUS WITHOUT COMPLICATION, WITHOUT LONG-TERM CURRENT USE OF INSULIN (HCC): ICD-10-CM

## 2025-02-17 DIAGNOSIS — E11.59 HYPERTENSION ASSOCIATED WITH TYPE 2 DIABETES MELLITUS (HCC): ICD-10-CM

## 2025-02-17 DIAGNOSIS — E11.9 TYPE 2 DIABETES MELLITUS WITHOUT COMPLICATION, WITHOUT LONG-TERM CURRENT USE OF INSULIN (HCC): Primary | ICD-10-CM

## 2025-02-17 LAB
ALBUMIN SERPL-MCNC: 5.1 G/DL (ref 3.2–4.8)
ALBUMIN/GLOB SERPL: 1.8 {RATIO} (ref 1–2)
ALP LIVER SERPL-CCNC: 94 U/L
ALT SERPL-CCNC: 24 U/L
ANION GAP SERPL CALC-SCNC: 8 MMOL/L (ref 0–18)
AST SERPL-CCNC: 14 U/L (ref ?–34)
BILIRUB SERPL-MCNC: 0.5 MG/DL (ref 0.3–1.2)
BUN BLD-MCNC: 12 MG/DL (ref 9–23)
BUN/CREAT SERPL: 15.6 (ref 10–20)
CALCIUM BLD-MCNC: 10.2 MG/DL (ref 8.7–10.4)
CHLORIDE SERPL-SCNC: 103 MMOL/L (ref 98–112)
CHOLEST SERPL-MCNC: 171 MG/DL (ref ?–200)
CO2 SERPL-SCNC: 30 MMOL/L (ref 21–32)
CREAT BLD-MCNC: 0.77 MG/DL
CREAT UR-SCNC: 40.1 MG/DL
EGFRCR SERPLBLD CKD-EPI 2021: 124 ML/MIN/1.73M2 (ref 60–?)
EST. AVERAGE GLUCOSE BLD GHB EST-MCNC: 134 MG/DL (ref 68–126)
FASTING PATIENT LIPID ANSWER: NO
FASTING STATUS PATIENT QL REPORTED: NO
GLOBULIN PLAS-MCNC: 2.9 G/DL (ref 2–3.5)
GLUCOSE BLD-MCNC: 118 MG/DL (ref 70–99)
HBA1C MFR BLD: 6.3 % (ref ?–5.7)
HDLC SERPL-MCNC: 33 MG/DL (ref 40–59)
LDLC SERPL CALC-MCNC: 97 MG/DL (ref ?–100)
MICROALBUMIN UR-MCNC: <0.3 MG/DL
NONHDLC SERPL-MCNC: 138 MG/DL (ref ?–130)
OSMOLALITY SERPL CALC.SUM OF ELEC: 293 MOSM/KG (ref 275–295)
POTASSIUM SERPL-SCNC: 4.5 MMOL/L (ref 3.5–5.1)
PROT SERPL-MCNC: 8 G/DL (ref 5.7–8.2)
SODIUM SERPL-SCNC: 141 MMOL/L (ref 136–145)
TRIGL SERPL-MCNC: 237 MG/DL (ref 30–149)
VLDLC SERPL CALC-MCNC: 39 MG/DL (ref 0–30)

## 2025-02-17 PROCEDURE — 83036 HEMOGLOBIN GLYCOSYLATED A1C: CPT

## 2025-02-17 PROCEDURE — 36415 COLL VENOUS BLD VENIPUNCTURE: CPT

## 2025-02-17 PROCEDURE — 80053 COMPREHEN METABOLIC PANEL: CPT

## 2025-02-17 PROCEDURE — 82570 ASSAY OF URINE CREATININE: CPT

## 2025-02-17 PROCEDURE — 80061 LIPID PANEL: CPT

## 2025-02-17 PROCEDURE — 82043 UR ALBUMIN QUANTITATIVE: CPT

## 2025-02-17 RX ORDER — TIRZEPATIDE 10 MG/.5ML
10 INJECTION, SOLUTION SUBCUTANEOUS WEEKLY
Qty: 2 ML | Refills: 1 | Status: SHIPPED | OUTPATIENT
Start: 2025-02-17

## 2025-02-17 NOTE — PROGRESS NOTES
INTERNAL MEDICINE OFFICE NOTE     Patient ID: Deirdre Lewis is a 29 year old male.  Today's Date: 02/17/25  Chief Complaint: Establish Care (medications)    HPI  1.  Deirdre is a pleasant 29-year-old male, with history of hypertension, presents today for blood pressure follow-up.  He does not have a cuff or machine at home to check his blood pressure, blood pressure in office today was elevated at 149/81.  He denies any dizziness, headaches, chest pain.  He is prescribed telmisartan for his blood pressure, however he has not been taking this daily.  He has not taken his medication today.  He denies any other treatments prior to arrival.  2.  Patient has history of type 2 diabetes without complications without long-term use of insulin.  He has been taking Jardiance daily as prescribed, and is currently on Mounjaro 7.5 mg weekly.  Patient has been tolerating these medications well.  He denies any side effects with Mounjaro.  Tolerating p.o. well.  He has been trying to improve his diet and exercise.      Vitals:    02/17/25 1401   BP: 149/81   Pulse: 98   Resp: 18   SpO2: 97%   Weight: 276 lb 12.8 oz (125.6 kg)   Height: 6' 2\" (1.88 m)     body mass index is 35.54 kg/m².  BP Readings from Last 3 Encounters:   02/17/25 149/81   11/12/24 142/68     The ASCVD Risk score (Kistler DK, et al., 2019) failed to calculate for the following reasons:    The 2019 ASCVD risk score is only valid for ages 40 to 79  Medications reviewed:  Current Outpatient Medications   Medication Sig Dispense Refill    Tirzepatide (MOUNJARO) 10 MG/0.5ML Subcutaneous Solution Auto-injector Inject 10 mg into the skin once a week. IF DOING WELL AFTER 4 DOSES INCREASE TO 12.5MG 2 mL 1    Tirzepatide (MOUNJARO) 7.5 MG/0.5ML Subcutaneous Solution Auto-injector Inject 7.5 mg into the skin once a week. See me back for refills/labs. 2 mL 1    Telmisartan 20 MG Oral Tab Take 1 tablet (20 mg total) by mouth at bedtime. FOR BLOOD PRESSURE. STOP  LOSARTAN. 90 tablet 5    empagliflozin (JARDIANCE) 25 MG Oral Tab Take 1 tablet (25 mg total) by mouth daily. FOR DIABETES. 90 tablet 9    fluticasone-salmeterol (ADVAIR DISKUS) 250-50 MCG/ACT Inhalation Aerosol Powder, Breath Activated Inhale 1 puff into the lungs 2 (two) times daily. 3 each 6    Tirzepatide (MOUNJARO) 2.5 MG/0.5ML Subcutaneous Solution Auto-injector Inject 2.5 mg into the skin once a week. If doing well after 4 doses then increase to 5mg. (Patient not taking: Reported on 2/17/2025) 2 mL 1    Tirzepatide (MOUNJARO) 5 MG/0.5ML Subcutaneous Solution Auto-injector Inject 5 mg into the skin once a week. If doing well after 4 doses then increase to 7.5mg. (Patient not taking: Reported on 2/17/2025) 2 mL 1    methylPREDNISolone (MEDROL) 4 MG Oral Tablet Therapy Pack As directed if symptoms fail to improve despite inhaler. (Patient not taking: Reported on 2/17/2025) 1 each 0         Assessment & Plan    1. Type 2 diabetes mellitus without complication, without long-term current use of insulin (HCC) (Primary)  -     Mounjaro; Inject 10 mg into the skin once a week. IF DOING WELL AFTER 4 DOSES INCREASE TO 12.5MG  Dispense: 2 mL; Refill: 1  -     Referral to Nutritionist/Dietician  2. Hypertension associated with type 2 diabetes mellitus (HCC)  -     [facility]  -     Referral to Nutritionist/Dietician      Plan  1.  Patient has type 2 diabetes he has been taking Jardiance and Mounjaro is prescribed.  His last A1C three months ago was 9.8.  He is due for labs today.  He is currently taking Mounjaro 7.5 mg dose weekly, he is tolerating this well without side effects.  We discussed increasing his dose after his fourth week to 10 mg doses.  If he continues to do well without side effects on the 10 mg we discussed possibly going to 15 mg and skipping the 12.5 dose, however, if he feels he would like to do the 12.5 mg dose before going to 15 we can also do that, he will let us know.  Patient  has been trying to improve his diet, however we will place a referral for nutrition/dietitian.  We will get his 3-month labs today after our visit.  Patient verbalizes understanding and agrees with plan.    2.  Patient has hypertension, his blood pressure readings have been elevated.  His blood pressure in the office today was 149/81.  He is not checking blood pressure at home as he does not have a blood pressure cuff to do so.  We discussed how to purchase an affordable blood pressure cuff/machine so that he may begin to check his blood pressure at home.  Patient is also not been taking his telmisartan as prescribed, he rarely takes it.  He did not take it today.  We discussed the importance of taking this medication regularly as prescribed.  Patient has enough medication refills at home.  Patient will upload blood pressure readings to the [facility] when available.  Will have patient follow-up based on lab results and fot blood pressure, this visit may be video or in office.  Patient verbalizes understanding and agrees with plan.    Follow Up: Return for FOLLOW UP DEPENDING ON LAB RESULTS, OFFICE OR VIDEO VISIT..         Objective: Results:   Physical Exam  Constitutional:       General: He is not in acute distress.     Appearance: Normal appearance.   HENT:      Head: Normocephalic and atraumatic.   Eyes:      Extraocular Movements: Extraocular movements intact.      Pupils: Pupils are equal, round, and reactive to light.   Cardiovascular:      Rate and Rhythm: Normal rate and regular rhythm.      Heart sounds: Normal heart sounds.   Pulmonary:      Effort: Pulmonary effort is normal.      Breath sounds: Normal breath sounds.   Skin:     General: Skin is warm and dry.   Neurological:      Mental Status: He is alert.        Reviewed:    Patient Active Problem List    Diagnosis    Type 2 diabetes mellitus without complication, without long-term current use of insulin (HCC)    Hypertension associated with type 2  diabetes mellitus (HCC)    Hyperlipidemia associated with type 2 diabetes mellitus (HCC)    Moderate persistent asthma without complication (HCC)    Statin medication declined by patient    Myalgia    Elevated ferritin      Allergies[1]     Social History     Socioeconomic History    Marital status:    Tobacco Use    Smoking status: Never    Smokeless tobacco: Never   Vaping Use    Vaping status: Never Used   Substance and Sexual Activity    Alcohol use: Not Currently    Drug use: Never     Social Drivers of Health     Food Insecurity: No Food Insecurity (2/17/2025)    NCSS - Food Insecurity     Worried About Running Out of Food in the Last Year: No     Ran Out of Food in the Last Year: No   Transportation Needs: No Transportation Needs (2/17/2025)    NCSS - Transportation     Lack of Transportation: No   Stress: Not on File (4/2/2024)    Received from [facility]    Stress     Stress: 0   Housing Stability: Not At Risk (2/17/2025)    NCSS - Housing/Utilities     Has Housing: Yes     Worried About Losing Housing: No     Unable to Get Utilities: No      Review of Systems   Constitutional: Negative.    Respiratory: Negative.     Cardiovascular: Negative.    Gastrointestinal: Negative.    Neurological: Negative.      All other systems negative unless otherwise stated in ROS or HPI above.       VEDA Paz  Internal Medicine       Call office with any questions or seek emergency care if necessary.   Patient understands and agrees to follow directions and advice.      ----------------------------------------- PATIENT INSTRUCTIONS-----------------------------------------   .    Patient Instructions   Continue and finish your 3 remaining Mounjaro pens at 7.5 mg.  You will then start Mounjaro 10 mg dosing for the next 4 weeks.  Let me know how you are feeling on the 10 mg, we can go from 10 mg to 15 mg if you have no side effects.  If you are having some side effects and want to do the 12.5 mg pens in between we can also  do that.  Continue your telmisartan medication for your blood pressure, please take this daily as prescribed.  Monitor your blood pressure at home, you can upload your readings through the [employer].  Will have your blood drawn and urine testing done today, and any results and recommendations will come through the [employer] so please keep an eye on that for messages.  Will have you follow-up based on lab results.               [1]   Allergies  Allergen Reactions    Dander HIVES

## 2025-02-17 NOTE — PATIENT INSTRUCTIONS
Continue and finish your 3 remaining Mounjaro pens at 7.5 mg.  You will then start Mounjaro 10 mg dosing for the next 4 weeks.  Let me know how you are feeling on the 10 mg, we can go from 10 mg to 15 mg if you have no side effects.  If you are having some side effects and want to do the 12.5 mg pens in between we can also do that.  Continue your telmisartan medication for your blood pressure, please take this daily as prescribed.  Monitor your blood pressure at home, you can upload your readings through the [employer].  Will have your blood drawn and urine testing done today, and any results and recommendations will come through the [employer] so please keep an eye on that for messages.  Will have you follow-up based on lab results.

## 2025-02-20 RX ORDER — METFORMIN HYDROCHLORIDE 500 MG/1
1000 TABLET, EXTENDED RELEASE ORAL 2 TIMES DAILY WITH MEALS
Qty: 360 TABLET | Refills: 0 | Status: SHIPPED | OUTPATIENT
Start: 2025-02-20

## 2025-02-20 NOTE — TELEPHONE ENCOUNTER
Please review.  Protocol failed/has no protocol.     Former patient of Dr. Flakito Villegas. Labs were completed on 02/17/2025. Medication refill pended for your review / approval.

## 2025-02-27 DIAGNOSIS — E11.9 TYPE 2 DIABETES MELLITUS WITHOUT COMPLICATION, WITHOUT LONG-TERM CURRENT USE OF INSULIN (HCC): ICD-10-CM

## 2025-03-04 RX ORDER — TIRZEPATIDE 10 MG/.5ML
10 INJECTION, SOLUTION SUBCUTANEOUS WEEKLY
Qty: 2 ML | Refills: 1 | OUTPATIENT
Start: 2025-03-04

## 2025-03-11 ENCOUNTER — TELEPHONE (OUTPATIENT)
Dept: ENDOCRINOLOGY | Facility: HOSPITAL | Age: 30
End: 2025-03-11

## 2025-03-11 DIAGNOSIS — E78.5 HYPERLIPIDEMIA ASSOCIATED WITH TYPE 2 DIABETES MELLITUS (HCC): ICD-10-CM

## 2025-03-11 DIAGNOSIS — E11.69 HYPERLIPIDEMIA ASSOCIATED WITH TYPE 2 DIABETES MELLITUS (HCC): ICD-10-CM

## 2025-03-11 DIAGNOSIS — E11.59 HYPERTENSION ASSOCIATED WITH TYPE 2 DIABETES MELLITUS (HCC): ICD-10-CM

## 2025-03-11 DIAGNOSIS — E11.9 TYPE 2 DIABETES MELLITUS WITHOUT COMPLICATION, WITHOUT LONG-TERM CURRENT USE OF INSULIN (HCC): Primary | ICD-10-CM

## 2025-03-11 DIAGNOSIS — I15.2 HYPERTENSION ASSOCIATED WITH TYPE 2 DIABETES MELLITUS (HCC): ICD-10-CM

## 2025-03-11 NOTE — TELEPHONE ENCOUNTER
Kaiden Rose,    Can you please sign the pended order for patient to be seen at the [facility] to see one of our Dieticians?    Thanks!  Mary

## 2025-03-16 ENCOUNTER — TELEPHONE (OUTPATIENT)
Facility: LOCATION | Age: 30
End: 2025-03-16

## 2025-03-16 DIAGNOSIS — E11.9 TYPE 2 DIABETES MELLITUS WITHOUT COMPLICATION, WITHOUT LONG-TERM CURRENT USE OF INSULIN (HCC): Primary | ICD-10-CM

## 2025-03-17 NOTE — TELEPHONE ENCOUNTER
Please relay to patient or Listed contact if unable to reach:  Mason Villegas is unfortunately no longer with our organization,  My name is Dr. Horner and I'm located in [facility] and assigned to his former patients thru [facility].   I am reaching out in regards to remind you that  you are due [facility] care visit with New Physician and You are also due for diabetes eye exam, If you do not have cataracts or floaters we can do a retinal screening in office in our [place] location, however if you have vision problems, I can place a referral to ophthalmologist if you don't already have one. If you already do have one please make sure that they please send a  Fax of your annual retinal screen to our office fax number: [phone]     Please complete this within the next month as Dr. Horner values providing high value evidence based medical care and prevention and would like to go over the next steps needed In your wellness endeavor.     However if you have established care with another provider please call our office [phone] or send us a [employer] message and we will remove your name from our list to indicate you have a new provider and will gladly send records to them if requested.      Wish you all the best in your endeavor for better health  -Dr. Sander Horner MD, MPH

## 2025-03-24 ENCOUNTER — APPOINTMENT (OUTPATIENT)
Dept: ENDOCRINOLOGY | Facility: HOSPITAL | Age: 30
End: 2025-03-24
Attending: NURSE PRACTITIONER
Payer: COMMERCIAL

## 2025-04-09 ENCOUNTER — LAB ENCOUNTER (OUTPATIENT)
Dept: LAB | Age: 30
End: 2025-04-09
Attending: NURSE PRACTITIONER
Payer: COMMERCIAL

## 2025-04-09 ENCOUNTER — OFFICE VISIT (OUTPATIENT)
Facility: LOCATION | Age: 30
End: 2025-04-09

## 2025-04-09 VITALS
WEIGHT: 266 LBS | DIASTOLIC BLOOD PRESSURE: 74 MMHG | HEIGHT: 74 IN | BODY MASS INDEX: 34.14 KG/M2 | TEMPERATURE: 98 F | OXYGEN SATURATION: 99 % | HEART RATE: 91 BPM | SYSTOLIC BLOOD PRESSURE: 111 MMHG

## 2025-04-09 DIAGNOSIS — I15.2 HYPERTENSION ASSOCIATED WITH TYPE 2 DIABETES MELLITUS (HCC): ICD-10-CM

## 2025-04-09 DIAGNOSIS — E11.59 HYPERTENSION ASSOCIATED WITH TYPE 2 DIABETES MELLITUS (HCC): ICD-10-CM

## 2025-04-09 DIAGNOSIS — E11.9 TYPE 2 DIABETES MELLITUS WITHOUT COMPLICATION, WITHOUT LONG-TERM CURRENT USE OF INSULIN (HCC): ICD-10-CM

## 2025-04-09 DIAGNOSIS — R53.83 FATIGUE, UNSPECIFIED TYPE: ICD-10-CM

## 2025-04-09 DIAGNOSIS — R53.1 WEAKNESS GENERALIZED: ICD-10-CM

## 2025-04-09 DIAGNOSIS — R25.1 TREMOR OF BOTH HANDS: ICD-10-CM

## 2025-04-09 DIAGNOSIS — M67.432 GANGLION CYST OF WRIST, LEFT: ICD-10-CM

## 2025-04-09 DIAGNOSIS — R19.7 DIARRHEA, UNSPECIFIED TYPE: ICD-10-CM

## 2025-04-09 DIAGNOSIS — N52.9 ERECTILE DYSFUNCTION, UNSPECIFIED ERECTILE DYSFUNCTION TYPE: ICD-10-CM

## 2025-04-09 DIAGNOSIS — E11.9 TYPE 2 DIABETES MELLITUS WITHOUT COMPLICATION, WITHOUT LONG-TERM CURRENT USE OF INSULIN (HCC): Primary | ICD-10-CM

## 2025-04-09 LAB
ALBUMIN SERPL-MCNC: 5 G/DL (ref 3.2–4.8)
ALBUMIN/GLOB SERPL: 1.6 {RATIO} (ref 1–2)
ALP LIVER SERPL-CCNC: 91 U/L (ref 45–117)
ALT SERPL-CCNC: 28 U/L (ref 10–49)
ANION GAP SERPL CALC-SCNC: 9 MMOL/L (ref 0–18)
AST SERPL-CCNC: 17 U/L (ref ?–34)
BILIRUB SERPL-MCNC: 0.6 MG/DL (ref 0.3–1.2)
BUN BLD-MCNC: 11 MG/DL (ref 9–23)
BUN/CREAT SERPL: 14.9 (ref 10–20)
CALCIUM BLD-MCNC: 9.8 MG/DL (ref 8.7–10.4)
CHLORIDE SERPL-SCNC: 103 MMOL/L (ref 98–112)
CO2 SERPL-SCNC: 30 MMOL/L (ref 21–32)
CREAT BLD-MCNC: 0.74 MG/DL (ref 0.7–1.3)
DEPRECATED HBV CORE AB SER IA-ACNC: 342 NG/ML (ref 50–336)
EGFRCR SERPLBLD CKD-EPI 2021: 125 ML/MIN/1.73M2 (ref 60–?)
EST. AVERAGE GLUCOSE BLD GHB EST-MCNC: 111 MG/DL (ref 68–126)
FASTING STATUS PATIENT QL REPORTED: NO
GLOBULIN PLAS-MCNC: 3.2 G/DL (ref 2–3.5)
GLUCOSE BLD-MCNC: 112 MG/DL (ref 70–99)
HBA1C MFR BLD: 5.5 % (ref ?–5.7)
IRON SATN MFR SERPL: 28 % (ref 20–50)
IRON SERPL-MCNC: 92 UG/DL (ref 65–175)
OSMOLALITY SERPL CALC.SUM OF ELEC: 294 MOSM/KG (ref 275–295)
POTASSIUM SERPL-SCNC: 4.5 MMOL/L (ref 3.5–5.1)
PROT SERPL-MCNC: 8.2 G/DL (ref 5.7–8.2)
SODIUM SERPL-SCNC: 142 MMOL/L (ref 136–145)
TOTAL IRON BINDING CAPACITY: 331 UG/DL (ref 250–425)
TRANSFERRIN SERPL-MCNC: 263 MG/DL (ref 215–365)

## 2025-04-09 PROCEDURE — 82728 ASSAY OF FERRITIN: CPT

## 2025-04-09 PROCEDURE — 85060 BLOOD SMEAR INTERPRETATION: CPT

## 2025-04-09 PROCEDURE — 80053 COMPREHEN METABOLIC PANEL: CPT

## 2025-04-09 PROCEDURE — 85027 COMPLETE CBC AUTOMATED: CPT

## 2025-04-09 PROCEDURE — 84466 ASSAY OF TRANSFERRIN: CPT

## 2025-04-09 PROCEDURE — 3008F BODY MASS INDEX DOCD: CPT | Performed by: NURSE PRACTITIONER

## 2025-04-09 PROCEDURE — 83540 ASSAY OF IRON: CPT

## 2025-04-09 PROCEDURE — 3074F SYST BP LT 130 MM HG: CPT | Performed by: NURSE PRACTITIONER

## 2025-04-09 PROCEDURE — 99214 OFFICE O/P EST MOD 30 MIN: CPT | Performed by: NURSE PRACTITIONER

## 2025-04-09 PROCEDURE — 3078F DIAST BP <80 MM HG: CPT | Performed by: NURSE PRACTITIONER

## 2025-04-09 PROCEDURE — 36415 COLL VENOUS BLD VENIPUNCTURE: CPT

## 2025-04-09 PROCEDURE — 83036 HEMOGLOBIN GLYCOSYLATED A1C: CPT

## 2025-04-09 RX ORDER — ROSUVASTATIN CALCIUM 5 MG/1
TABLET, COATED ORAL
COMMUNITY
Start: 2025-02-15

## 2025-04-09 RX ORDER — TIRZEPATIDE 12.5 MG/.5ML
12.5 INJECTION, SOLUTION SUBCUTANEOUS WEEKLY
Qty: 2 ML | Refills: 1 | Status: SHIPPED | OUTPATIENT
Start: 2025-04-09

## 2025-04-09 RX ORDER — TIRZEPATIDE 15 MG/.5ML
15 INJECTION, SOLUTION SUBCUTANEOUS WEEKLY
Qty: 2 ML | Refills: 1 | Status: SHIPPED | OUTPATIENT
Start: 2025-04-09

## 2025-04-09 NOTE — PATIENT INSTRUCTIONS
VISIT SUMMARY:  Today, you were seen for weakness, low energy, diarrhea, erectile dysfunction, and a bump on your left wrist. We discussed your symptoms and reviewed your current medications. Blood tests were ordered to better understand your condition, and we made some adjustments to your medication regimen.    YOUR PLAN:  -FATIGUE AND WEAKNESS: Your weakness and low energy are likely due to low blood sugar from your increased Mounjaro dose and maximum Jardiance dose, along with reduced food intake. We will check your blood sugar, electrolytes, kidney function, and complete blood count (CBC) with blood tests. You should eat smaller, frequent meals with more protein and keep snacks like granola bars handy. Stop taking Jardiance for at least three days, and we will reassess based on your lab results.    -DIARRHEA: Your diarrhea is likely due to a viral infection, given the sudden onset and lack of other symptoms. Follow the BRAT diet (bananas, rice, applesauce, toast) and avoid greasy or spicy foods. If your symptoms persist, we may need to do further evaluation.    -ERECTILE DYSFUNCTION: Your recent erectile dysfunction may be related to your hypertension and diabetes. We will evaluate further after your lab results come in. If the issue continues, we may refer you to a specialist.    -HYPERTENSION: Your blood pressure is well-controlled with your current medication, telmisartan 20 mg. Continue taking it once daily in the evening and keep monitoring your blood pressure at home.    -TYPE 2 DIABETES MELLITUS: Your diabetes is managed with Mounjaro and Jardiance. The recent increase in Mounjaro may be causing low blood sugar. Stop taking Jardiance for at least three days, and we will reassess based on your lab results. Make sure to monitor your diet and eat regular meals.    -GANGLION CYST: The bump on your left wrist is a ganglion cyst, which is a fluid-filled lump. It is not painful or affecting your function. Rest,  use ibuprofen, and apply ice to the area. Monitor for any changes, and if it becomes bothersome, we may refer you to an orthopedic specialist.    INSTRUCTIONS:  Please follow up with the blood tests we ordered for blood sugar, electrolytes, kidney function, and complete blood count (CBC). Stop taking Jardiance for at least three days and monitor your symptoms. If your diarrhea persists or if you have any other concerns, please contact us.    Contains text generated by Katja

## 2025-04-09 NOTE — PROGRESS NOTES
OFFICE NOTE       The following individual(s) verbally consented to be recorded using ambient AI listening technology and understand that they can each withdraw their consent to this listening technology at any point by asking the clinician to turn off or pause the recording:    Patient name: Deirdre Lewis  Additional names:  none          Patient ID: Deirdre Lewis is a 29 year old male.  Today's Date: 04/09/25  Chief Complaint: Other (Not feeling well /Started Thursday /Feeling weak and sore abdomen /Hand shakes )    HPI  History of Present Illness  Deirdre Lewis is a 29 year old male with diabetes and hypertension who presents with weakness and low energy.    He has been experiencing weakness and low energy since last Thursday, describing his body as feeling 'really weak' with hand tremors and leg weakness. These symptoms remind him of a previous experience when he was unaware of his diabetes. He also notes a decrease in appetite, likely due to Mounjaro, and attributes irregular eating patterns to his fast-paced lifestyle. No shortness of breath, chest pain, vomiting, fever, or blood in the stool or urine.    He reports diarrhea, describing it as 'more water based,' with an episode occurring at 3 AM last night. He denies any blood in the stool or abdominal pain, though he describes a soreness akin to post-exercise soreness. No vomiting, fever, or blood in the urine, and urination is normal.    He has been experiencing erectile dysfunction since last week, which he finds concerning. He is worried about the impact of his diabetes and previously uncontrolled hypertension on this condition.    He mentions a bump on his left wrist noticed about two weeks ago, which does not hurt. He is concerned it might be a fluid buildup.    He is currently on telmisartan 20 mg in the evening, rosuvastatin in the evening, and takes Jardiance daily, though the timing varies. He is also on Mounjaro 10 mg and has stopped taking  metformin.       Vitals:    04/09/25 0913   BP: 111/74   Pulse: 91   Temp: 97.7 °F (36.5 °C)   TempSrc: Oral   SpO2: 99%   Weight: 266 lb (120.7 kg)   Height: 6' 2\" (1.88 m)     body mass index is 34.15 kg/m².  BP Readings from Last 3 Encounters:   04/09/25 111/74   02/17/25 149/81   11/12/24 142/68     The ASCVD Risk score (Juventino DK, et al., 2019) failed to calculate for the following reasons:    The 2019 ASCVD risk score is only valid for ages 40 to 79  Results         Medications reviewed:  Current Medications[1]      Assessment & Plan    1. Type 2 diabetes mellitus without complication, without long-term current use of insulin (HCC) (Primary)  -     Comp Metabolic Panel (14); Future; Expected date: 04/09/2025  -     Hemoglobin A1C; Future; Expected date: 04/09/2025  -     Mounjaro; Inject 12.5 mg into the skin once a week. For 4 weeks.  Dispense: 2 mL; Refill: 1  -     Mounjaro; Inject 15 mg into the skin once a week.  Dispense: 2 mL; Refill: 1  2. Hypertension associated with type 2 diabetes mellitus (HCC)  -     Comp Metabolic Panel (14); Future; Expected date: 04/09/2025  3. Fatigue, unspecified type  -     CBC, Platelet; No Differential; Future; Expected date: 04/09/2025  -     Iron And Tibc; Future; Expected date: 04/09/2025  -     Ferritin; Future; Expected date: 04/09/2025  4. Weakness generalized  -     CBC, Platelet; No Differential; Future; Expected date: 04/09/2025  -     Iron And Tibc; Future; Expected date: 04/09/2025  -     Ferritin; Future; Expected date: 04/09/2025  -     Hemoglobin A1C; Future; Expected date: 04/09/2025  5. Tremor of both hands  -     Hemoglobin A1C; Future; Expected date: 04/09/2025  6. Diarrhea, unspecified type  -     Comp Metabolic Panel (14); Future; Expected date: 04/09/2025  7. Ganglion cyst of wrist, left  8. Erectile dysfunction, unspecified erectile dysfunction type    Assessment & Plan  Fatigue and Weakness  Weakness and low energy likely due to hypoglycemia from  increased Mounjaro and maximum Jardiance doses, compounded by reduced food intake. Viral illness considered.  - Order blood tests for blood sugar, electrolytes, kidney function, and CBC, Iron.  - Advise smaller, frequent meals with increased protein.  - Recommend snacks like granola bars for potential hypoglycemia.  - Hold Jardiance for at least three days and reassess based on lab results.    Diarrhea  Increased bowel movements with watery stools suggest viral gastroenteritis due to acute onset and lack of systemic symptoms.  - Advise BRAT diet (bananas, rice, applesauce, toast).  - Recommend avoiding greasy or spicy foods.  - Consider further evaluation if symptoms persist.  - Adequate hydration    Erectile Dysfunction  Recent erectile dysfunction possibly linked to hypertension and diabetes. Further evaluation planned post-lab results.  - Order lab tests for blood sugar, electrolytes, and anemia.  - Discuss potential specialist referral if issue persists after addressing contributing factors.    Hypertension  Hypertension well-controlled with telmisartan 20 mg. Home readings normal, medication compliance noted.  - Continue telmisartan 20 mg once daily in the evening.  - Advise continued home blood pressure monitoring.    Type 2 Diabetes Mellitus  Managed with Mounjaro and Jardiance. Recent Mounjaro increase may cause hypoglycemia. Jardiance held to assess impact.  - Hold Jardiance for at least three days and reassess based on lab results.  - Order lab tests for blood sugar and kidney function.  - Advise monitoring dietary intake and ensuring regular meals.    Ganglion Cyst  Ganglion cyst on left wrist, not painful or impairing function. Conservative management advised.  - Recommend rest, ibuprofen, and ice application.  - Monitor for changes in size or symptoms, consider orthopedics referral if bothersome.       Follow Up: As needed/if symptoms worsen or Return for BLOOD PRESSURE; TRACK AT HOME, BRING RESULTS, AS  NEEDED/IF SYMPTOMS WORSEN, DEPENDING ON RESULTS..     I spent 40 minutes obtaining pertitent medical history, reviewing pertinent imaging/labs and specialists notes, evaluating patient, discussing differential diagnosis' and various treatment options, reinforcing importance of compliance with treatment plan, and completing documentation.         Objective/ Results:   Physical Exam   Physical Exam  VITALS: BP- 111/74  GENERAL: Alert, cooperative, well developed, no acute distress  HEENT: Normocephalic, normal oropharynx, moist mucous membranes  CHEST: Clear to auscultation bilaterally, no wheezes, rhonchi, or crackles  CARDIOVASCULAR: Normal heart rate and rhythm, S1 and S2 normal without murmurs  ABDOMEN: Soft, non-tender, non-distended, without organomegaly, normal bowel sounds  EXTREMITIES: No cyanosis or edema  NEUROLOGICAL: Cranial nerves grossly intact, moves all extremities without gross motor or sensory deficit     Reviewed:    Patient Active Problem List    Diagnosis    Type 2 diabetes mellitus without complication, without long-term current use of insulin (HCC)    Hypertension associated with type 2 diabetes mellitus (HCC)    Hyperlipidemia associated with type 2 diabetes mellitus (HCC)    Moderate persistent asthma without complication (HCC)    Statin medication declined by patient    Myalgia    Elevated ferritin      Allergies[2]   Short Social Hx on File[3]   Review of Systems    All other systems negative unless otherwise stated in ROS or HPI above.       VEDA Paz  Internal Medicine       Call office with any questions or seek emergency care if necessary.   Patient understands and agrees to follow directions and advice.      ----------------------------------------- PATIENT INSTRUCTIONS-----------------------------------------     Patient Instructions   VISIT SUMMARY:  Today, you were seen for weakness, low energy, diarrhea, erectile dysfunction, and a bump on your left wrist. We discussed your  symptoms and reviewed your current medications. Blood tests were ordered to better understand your condition, and we made some adjustments to your medication regimen.    YOUR PLAN:  -FATIGUE AND WEAKNESS: Your weakness and low energy are likely due to low blood sugar from your increased Mounjaro dose and maximum Jardiance dose, along with reduced food intake. We will check your blood sugar, electrolytes, kidney function, and complete blood count (CBC) with blood tests. You should eat smaller, frequent meals with more protein and keep snacks like granola bars handy. Stop taking Jardiance for at least three days, and we will reassess based on your lab results.    -DIARRHEA: Your diarrhea is likely due to a viral infection, given the sudden onset and lack of other symptoms. Follow the BRAT diet (bananas, rice, applesauce, toast) and avoid greasy or spicy foods. If your symptoms persist, we may need to do further evaluation.    -ERECTILE DYSFUNCTION: Your recent erectile dysfunction may be related to your hypertension and diabetes. We will evaluate further after your lab results come in. If the issue continues, we may refer you to a specialist.    -HYPERTENSION: Your blood pressure is well-controlled with your current medication, telmisartan 20 mg. Continue taking it once daily in the evening and keep monitoring your blood pressure at home.    -TYPE 2 DIABETES MELLITUS: Your diabetes is managed with Mounjaro and Jardiance. The recent increase in Mounjaro may be causing low blood sugar. Stop taking Jardiance for at least three days, and we will reassess based on your lab results. Make sure to monitor your diet and eat regular meals.    -GANGLION CYST: The bump on your left wrist is a ganglion cyst, which is a fluid-filled lump. It is not painful or affecting your function. Rest, use ibuprofen, and apply ice to the area. Monitor for any changes, and if it becomes bothersome, we may refer you to an orthopedic  specialist.    INSTRUCTIONS:  Please follow up with the blood tests we ordered for blood sugar, electrolytes, kidney function, and complete blood count (CBC). Stop taking Jardiance for at least three days and monitor your symptoms. If your diarrhea persists or if you have any other concerns, please contact us.    Contains text generated by Katja          The 21st Century Cures Act makes medical notes available to patients in the interest of transparency.  However, please be advised that this is a medical document.  It is intended as a peer to peer communication.  It is written in medical language and may contain abbreviations or verbiage that are technical and unfamiliar.  It may appear blunt or direct.  Medical documents are intended to carry relevant information, facts as evident, and the clinical opinion of the practitioner.            [1]   Current Outpatient Medications   Medication Sig Dispense Refill    rosuvastatin 5 MG Oral Tab       Tirzepatide (MOUNJARO) 12.5 MG/0.5ML Subcutaneous Solution Auto-injector Inject 12.5 mg into the skin once a week. For 4 weeks. 2 mL 1    Tirzepatide (MOUNJARO) 15 MG/0.5ML Subcutaneous Solution Auto-injector Inject 15 mg into the skin once a week. 2 mL 1    Tirzepatide (MOUNJARO) 10 MG/0.5ML Subcutaneous Solution Auto-injector Inject 10 mg into the skin once a week. IF DOING WELL AFTER 4 DOSES INCREASE TO 12.5MG 2 mL 1    Telmisartan 20 MG Oral Tab Take 1 tablet (20 mg total) by mouth at bedtime. FOR BLOOD PRESSURE. STOP LOSARTAN. 90 tablet 5    empagliflozin (JARDIANCE) 25 MG Oral Tab Take 1 tablet (25 mg total) by mouth daily. FOR DIABETES. 90 tablet 9    METFORMIN  MG Oral Tablet 24 Hr TAKE 2 TABLETS(1000 MG) BY MOUTH TWICE DAILY WITH MEALS (Patient not taking: Reported on 4/9/2025) 360 tablet 0    fluticasone-salmeterol (ADVAIR DISKUS) 250-50 MCG/ACT Inhalation Aerosol Powder, Breath Activated Inhale 1 puff into the lungs 2 (two) times daily. (Patient not taking:  Reported on 4/9/2025) 3 each 6    methylPREDNISolone (MEDROL) 4 MG Oral Tablet Therapy Pack As directed if symptoms fail to improve despite inhaler. (Patient not taking: Reported on 4/9/2025) 1 each 0   [2]   Allergies  Allergen Reactions    Dander HIVES   [3]   Social History  Socioeconomic History    Marital status:    Tobacco Use    Smoking status: Never    Smokeless tobacco: Never   Vaping Use    Vaping status: Never Used   Substance and Sexual Activity    Alcohol use: Not Currently    Drug use: Never     Social Drivers of Health     Food Insecurity: No Food Insecurity (2/17/2025)    NCSS - Food Insecurity     Worried About Running Out of Food in the Last Year: No     Ran Out of Food in the Last Year: No   Transportation Needs: No Transportation Needs (2/17/2025)    NCSS - Transportation     Lack of Transportation: No   Stress: Not on File (4/2/2024)    Received from [facility]    Stress     Stress: 0   Housing Stability: Not At Risk (2/17/2025)    NCSS - Housing/Utilities     Has Housing: Yes     Worried About Losing Housing: No     Unable to Get Utilities: No

## 2025-04-10 LAB
DEPRECATED RDW RBC AUTO: 36.5 FL (ref 35.1–46.3)
ERYTHROCYTE [DISTWIDTH] IN BLOOD BY AUTOMATED COUNT: 13 % (ref 11–15)
HCT VFR BLD AUTO: 46.4 % (ref 39–53)
HGB BLD-MCNC: 14.5 G/DL (ref 13–17.5)
MCH RBC QN AUTO: 24.1 PG (ref 26–34)
MCHC RBC AUTO-ENTMCNC: 31.3 G/DL (ref 31–37)
MCV RBC AUTO: 77.1 FL (ref 80–100)
PLATELET # BLD AUTO: 389 10(3)UL (ref 150–450)
RBC # BLD AUTO: 6.02 X10(6)UL (ref 4.3–5.7)
WBC # BLD AUTO: 7.2 X10(3) UL (ref 4–11)

## 2025-04-17 ENCOUNTER — TELEMEDICINE (OUTPATIENT)
Facility: LOCATION | Age: 30
End: 2025-04-17
Payer: COMMERCIAL

## 2025-04-17 DIAGNOSIS — R53.83 FATIGUE, UNSPECIFIED TYPE: ICD-10-CM

## 2025-04-17 DIAGNOSIS — N52.9 ERECTILE DYSFUNCTION, UNSPECIFIED ERECTILE DYSFUNCTION TYPE: ICD-10-CM

## 2025-04-17 DIAGNOSIS — I15.2 HYPERTENSION ASSOCIATED WITH TYPE 2 DIABETES MELLITUS (HCC): Primary | ICD-10-CM

## 2025-04-17 DIAGNOSIS — R19.7 DIARRHEA, UNSPECIFIED TYPE: ICD-10-CM

## 2025-04-17 DIAGNOSIS — E11.9 TYPE 2 DIABETES MELLITUS WITHOUT COMPLICATION, WITHOUT LONG-TERM CURRENT USE OF INSULIN (HCC): ICD-10-CM

## 2025-04-17 DIAGNOSIS — E11.59 HYPERTENSION ASSOCIATED WITH TYPE 2 DIABETES MELLITUS (HCC): Primary | ICD-10-CM

## 2025-04-17 DIAGNOSIS — R79.89 ELEVATED FERRITIN: ICD-10-CM

## 2025-04-17 PROCEDURE — 98006 SYNCH AUDIO-VIDEO EST MOD 30: CPT | Performed by: NURSE PRACTITIONER

## 2025-04-17 RX ORDER — TADALAFIL 5 MG/1
5 TABLET ORAL
Qty: 20 TABLET | Refills: 1 | Status: SHIPPED | OUTPATIENT
Start: 2025-04-17

## 2025-04-17 NOTE — PATIENT INSTRUCTIONS
Continue mounjaro for diabetes as prescribed. Increasing dose as discussed. Should have refills at [facility].  Stop metformin.  Continue to hold jaridance, will recheck labs in 6 months at next visit.  Increased ferritin level, I have placed referral to hematology. Please call and schedule this appointment. Diet changes discussed to help with lowering iron absorption - foods and drink with tannins such as tea, coffee, dark chocolate, grapes, berries, apples, nuts, and legumes.  Continue to monitor blood pressure at home and bring in home values at next visit. Continue with telmisartan.  Monitor diarrhea. I have ordered stool study tests to rule out any infection. You will need to  stool kit at lab and then return samples to lab.   Sent prescription for tadalafil, generic for Cialis to your pharmacy for erectile dysfunction. Take as directed. Monitor your blood pressure closely when taking this medication.  Will plan for follow up in 6 months for diabetes, hypertension recheck. We can get labs after our visit.

## 2025-04-17 NOTE — PROGRESS NOTES
INTERNAL MEDICINE VIDEO VISIT NOTE     Patient ID: Deirdre Lewis is a 29 year old male.  Today's Date: 04/17/25  HPI  - Follow up visit, increased ferritin level. Testing blood smear done in November negative for hemochromatosis, however ferritin level remains elevated. Iron levels normal, liver function normal. He has continued low energy and fatigue.   - Patient has history of diabetes taking only mounjaro currently titrating up hsi dosing, tolerating well. Stopped jardiance one week ago, stopped metormin few months ago due to GI upset. Diabetes well controlled, A1C 5.5.   - Erectile dysfunction, has been experiencing for past month. He finds this concerning and is worried about the impact of his diabetes and previously uncontrolled hypertension on this condition. He is also worried about starting a family, family planning difficulty.  He denies any current urinary problems. Denies dysuria, difficulty urinating or nocturia. Denies any blood in urine. Denies abdominal pain or fevers.  - Diarrhea with fatigue, improving but still having loose stools. No blood in stool.  - History of hypertension, well controlled on telmisartan.    There were no vitals filed for this visit.  body mass index is unknown because there is no height or weight on file.  BP Readings from Last 3 Encounters:   04/09/25 111/74   02/17/25 149/81   11/12/24 142/68     The ASCVD Risk score (Juventino DK, et al., 2019) failed to calculate for the following reasons:    The 2019 ASCVD risk score is only valid for ages 40 to 79  Medications reviewed:  Current Medications[1]      Assessment & Plan    1. Hypertension associated with type 2 diabetes mellitus (HCC) (Primary)  2. Type 2 diabetes mellitus without complication, without long-term current use of insulin (HCC)  3. Elevated ferritin  -     Oncology/Hematology Referral - In Network  4. Diarrhea, unspecified type  -     Giardia + Crypto Antigen, Stool; Future; Expected date: 04/17/2025  -      Stool Culture W/Shigatoxin; Future; Expected date: 04/17/2025  -     C. diff toxigenic PCR ([facility]); Future; Expected date: 04/17/2025  -     Norovirus, RT PCR; Future; Expected date: 04/17/2025  5. Fatigue, unspecified type  -     Oncology/Hematology Referral - In Network  -     Norovirus, RT PCR; Future; Expected date: 04/17/2025  6. Erectile dysfunction, unspecified erectile dysfunction type  -     Tadalafil; Take 1 tablet (5 mg total) by mouth daily as needed for Erectile Dysfunction.  Dispense: 20 tablet; Refill: 1    - Follow up visit, increased ferritin level. Testing blood smear done in November negative for hemochromatosis, however ferritin level remains elevated. Iron levels normal, liver function normal. He has continued low energy and fatigue. Will place referral for hematology.  Discussed diet changes such as foods and drink with tannins tea, coffee, dark chocolate, berries, nuts, and legumes to help with lowering iron absorption.   - Patient has history of diabetes taking only mounjaro currently titrating up dosing, tolerating well. Stopped jardiance one week ago, stopped metormin few months ago due to GI upset. Diabetes well controlled, A1C 5.5.   Continue to hold jariance. Will follow up with labs in 6 months  - Erectile dysfunction, has been experiencing for past month. He finds this concerning and is worried about the impact of his diabetes and previously uncontrolled hypertension on this condition. He is also worried about starting a family, family planning difficulty.  He denies any current urinary problems. Denies dysuria, difficulty urinating or nocturia. Denies any blood in urine. Denies abdominal pain or fevers.  Cialis sent to pharmacy. Possible urology referral if symptoms persist. Monitor blood pressure  - Diarrhea with fatigue, improving but still having loose stools. No blood in stool.  Stop and continue to hold metformin. Will order stool studies to rule out infection process.  -  History of hypertension, well controlled on telmisartan.  Continue telmisartan. Monitor blood pressure at home, bring in readings at follow up visit.    I spent 30 minutes obtaining pertitent medical history, reviewing pertinent imaging/labs and specialists notes, evaluating patient, discussing differential diagnosis' and various treatment options, reinforcing importance of compliance with treatment plan, and completing documentation.       Follow Up: Return in about 6 months (around 10/17/2025) for BLOOD PRESSURE; TRACK AT HOME, BRING RESULTS, DIABETES, WE WILL GET LABS AFTER YOUR VISIT..         Objective: Results:   Physical Exam  Nursing note reviewed.   Constitutional:       General: He is not in acute distress.     Appearance: Normal appearance. He is not ill-appearing.   HENT:      Head: Normocephalic and atraumatic.      Right Ear: External ear normal.      Left Ear: External ear normal.      Nose: Nose normal.   Eyes:      Conjunctiva/sclera: Conjunctivae normal.   Pulmonary:      Effort: Pulmonary effort is normal. No respiratory distress.   Musculoskeletal:      Cervical back: Normal range of motion and neck supple.   Skin:     Coloration: Skin is not jaundiced.   Neurological:      General: No focal deficit present.      Mental Status: He is alert and oriented to person, place, and time. Mental status is at baseline.   Psychiatric:         Mood and Affect: Mood normal.         Thought Content: Thought content normal.        Reviewed:  Patient Active Problem List    Diagnosis    Type 2 diabetes mellitus without complication, without long-term current use of insulin (HCC)    Hypertension associated with type 2 diabetes mellitus (HCC)    Hyperlipidemia associated with type 2 diabetes mellitus (HCC)    Moderate persistent asthma without complication (HCC)    Statin medication declined by patient    Myalgia    Elevated ferritin      Allergies[2]   Short Social Hx on File[3]   Review of Systems    Constitutional:  Positive for fatigue. Negative for fever.   Respiratory: Negative.     Cardiovascular: Negative.    Gastrointestinal:  Positive for diarrhea. Negative for abdominal pain, blood in stool, constipation, nausea and vomiting.   Genitourinary:  Negative for difficulty urinating, dysuria, frequency and hematuria.   Neurological: Negative.      All other systems negative unless otherwise stated in ROS or HPI above.       VEDA Paz  Internal Medicine       Call office with any questions or seek emergency care if necessary.   Patient understands and agrees to follow directions and advice.    Telehealth Verbal Consent   I conducted a telehealth visit with ri Joshua marcy, 04/17/25, which was completed using two-way, real-time interactive audio and video communication. This has been done in good jt to provide continuity of care in the best interest of the provider-patient relationship, due to the COVID -19 public health crisis/national emergency where restrictions of face-to-face office visits are ongoing. Every conscious effort was taken to allow for sufficient and adequate time to complete the visit.The patient was made aware of the limitations of the telehealth visit, including treatment limitations as no physical exam could be performed.  The patient was advised to call 911 or to go to the ER in case there was an emergency.  The patient was also advised of the potential privacy & security concerns related to the telehealth platform. The patient was made aware of where to find [facility]'s notice of privacy practices, telehealth consent form and other related consent forms and documents.  which are located on the [facility] website. The patient verbally agreed to telehealth consent form, related consents and the risks discussed.  Lastly, the patient confirmed that they were in [place]. Included in this visit, time may have been spent reviewing labs, medications, radiology tests and decision making. Appropriate  medical decision-making and tests are ordered as detailed in the plan of care above.  Coding/billing information is submitted for this visit based on complexity of care and/or time spent for the visit.  ----------------------------------------- PATIENT INSTRUCTIONS-----------------------------------------     Patient Instructions   Continue mounjaro for diabetes as prescribed. Increasing dose as discussed. Should have refills at [facility].  Stop metformin.  Continue to hold jaridance, will recheck labs in 6 months at next visit.  Increased ferritin level, I have placed referral to hematology. Please call and schedule this appointment. Diet changes discussed to help with lowering iron absorption - foods and drink with tannins such as tea, coffee, dark chocolate, grapes, berries, apples, nuts, and legumes.  Continue to monitor blood pressure at home and bring in home values at next visit. Continue with telmisartan.  Monitor diarrhea. I have ordered stool study tests to rule out any infection. You will need to  stool kit at lab and then return samples to lab.   Sent prescription for tadalafil, generic for Cialis to your pharmacy for erectile dysfunction. Take as directed. Monitor your blood pressure closely when taking this medication.  Will plan for follow up in 6 months for diabetes, hypertension recheck. We can get labs after our visit.           [1]   Current Outpatient Medications   Medication Sig Dispense Refill    tadalafil 5 MG Oral Tab Take 1 tablet (5 mg total) by mouth daily as needed for Erectile Dysfunction. 20 tablet 1    rosuvastatin 5 MG Oral Tab       Tirzepatide (MOUNJARO) 12.5 MG/0.5ML Subcutaneous Solution Auto-injector Inject 12.5 mg into the skin once a week. For 4 weeks. 2 mL 1    Tirzepatide (MOUNJARO) 15 MG/0.5ML Subcutaneous Solution Auto-injector Inject 15 mg into the skin once a week. 2 mL 1    METFORMIN  MG Oral Tablet 24 Hr TAKE 2 TABLETS(1000 MG) BY MOUTH TWICE DAILY WITH  MEALS (Patient not taking: Reported on 4/9/2025) 360 tablet 0    Tirzepatide (MOUNJARO) 10 MG/0.5ML Subcutaneous Solution Auto-injector Inject 10 mg into the skin once a week. IF DOING WELL AFTER 4 DOSES INCREASE TO 12.5MG 2 mL 1    Telmisartan 20 MG Oral Tab Take 1 tablet (20 mg total) by mouth at bedtime. FOR BLOOD PRESSURE. STOP LOSARTAN. 90 tablet 5    empagliflozin (JARDIANCE) 25 MG Oral Tab Take 1 tablet (25 mg total) by mouth daily. FOR DIABETES. 90 tablet 9    fluticasone-salmeterol (ADVAIR DISKUS) 250-50 MCG/ACT Inhalation Aerosol Powder, Breath Activated Inhale 1 puff into the lungs 2 (two) times daily. (Patient not taking: Reported on 4/9/2025) 3 each 6    methylPREDNISolone (MEDROL) 4 MG Oral Tablet Therapy Pack As directed if symptoms fail to improve despite inhaler. (Patient not taking: Reported on 4/9/2025) 1 each 0   [2]   Allergies  Allergen Reactions    Dander HIVES   [3]   Social History  Socioeconomic History    Marital status:    Tobacco Use    Smoking status: Never    Smokeless tobacco: Never   Vaping Use    Vaping status: Never Used   Substance and Sexual Activity    Alcohol use: Not Currently    Drug use: Never     Social Drivers of Health     Food Insecurity: No Food Insecurity (2/17/2025)    NCSS - Food Insecurity     Worried About Running Out of Food in the Last Year: No     Ran Out of Food in the Last Year: No   Transportation Needs: No Transportation Needs (2/17/2025)    NCSS - Transportation     Lack of Transportation: No   Stress: Not on File (4/2/2024)    Received from [facility]    Stress     Stress: 0   Housing Stability: Not At Risk (2/17/2025)    NCSS - Housing/Utilities     Has Housing: Yes     Worried About Losing Housing: No     Unable to Get Utilities: No

## 2025-04-28 ENCOUNTER — HOSPITAL ENCOUNTER (OUTPATIENT)
Dept: ENDOCRINOLOGY | Facility: HOSPITAL | Age: 30
End: 2025-04-28
Attending: NURSE PRACTITIONER
Payer: COMMERCIAL

## 2025-04-28 DIAGNOSIS — E11.9 TYPE 2 DIABETES MELLITUS WITHOUT COMPLICATION, WITHOUT LONG-TERM CURRENT USE OF INSULIN (HCC): Primary | ICD-10-CM

## 2025-04-28 PROCEDURE — 97802 MEDICAL NUTRITION INDIV IN: CPT

## 2025-04-28 NOTE — PROGRESS NOTES
Medical Nutrition Therapy Assessment    Deirdre Lewis 6/24/1995 was seen for individual Diabetic Medical Nutrition Therapy:    Date: 4/28/2025   Start time 9A  End time: 10A    Assessment    Looking for additional information on DM management  Lives with wife, wants to healthy life and to have kids  Some reports of low sugar feelings recently - instructed to stop Jardiance  Taking Mounjaro still - occasional nausea, takes on Wed but emesis this past Saturday  States he likely doesn't sleep as long as he should   at [employer] - starts work at 11a, home around 9p - some stress, working a lot of Saturdays    Recently found out Dad has DM as well    Anthropometrics:    Weight: 263lb today  Highest weight in last 5 years around 320#      Current diabetes medications:  Medications - Current[1]    DM meds - only taking Mounjaro currently     Labs:  Lab Results   Component Value Date    A1C 5.5 04/09/2025    A1C 6.3 (H) 02/17/2025    CHOLEST 171 02/17/2025    CHOLEST 183 11/11/2024    LDL 97 02/17/2025     (H) 11/11/2024    HDL 33 (L) 02/17/2025    HDL 34 (L) 11/11/2024    NONHDLC 138 (H) 02/17/2025    NONHDLC 149 (H) 11/11/2024    TRIG 237 (H) 02/17/2025    TRIG 243 (H) 11/11/2024    BUN 11 04/09/2025    BUN 12 02/17/2025    CREATSERUM 0.74 04/09/2025    CREATSERUM 0.77 02/17/2025       SMBG at home: provided no cost Dexcom G7 sample to assess for low sugars and for education purposes  Diet Hx:    Diet Recall:   (AM) not eating breakfast recently - loves eggs, yogurt - greek  (LUNCH) sandwich - turkey based (chicken)   (DINNER) stomach upset - rice and bread (BRAT)  Don't eat as much as used to  Water - electrolyte  OJ/Apple Juice - sugar free/Water - every morning      Medications - Current[2]    Physical Activity: has signed on with a  to learn more re weight training    Nutrition Diagnosis  Food/nutrition related knowledge deficit related to healthy eating/activity with DM as  evidenced by request for information    Intervention  Comprehensive Nutrition Education Provided:     [] discussed healthy weight management, glucose management, lipid management, and BP management as related to diabetes   [x] discussed basic meal planning guidelines for diabetes regular mealtime, limited concentrated sweets. Worked on establishing eating pattern/timing of meals and snacks    [] discussed in depth meal planning using the healthy eating with diabetes plate method with focus on balanced macronutrient consumption, including identifying foods that are carbohydrates, lean protein, non-starchy vegetables, and heart healthy fats   [] stressed importance of carbohydrate consistency in each meal   [] recommended carbohydrate targets of  grams at meals and  grams at snacks   [] educated on label reading   [x] educated on portion control; including use of measuring cups, spoons, or food scale   [x] provided suggestions for lower carb, healthy snacks   [] educated on importance of food monitoring and how to use food logs   [] discussed how to handle special occasions, dining out, and eating on the go   [] discussed menu planning, healthy food shopping, cooking tips, and need to pre prepare foods    [] educated on emotional eating and being mindful at meals   []  reviewed other behavior modification strategies    []emphasized the need for small, sustainable changes and working on SMART goals to encourage sustainable behaviors    [] instructions on how to use helpful websites or nutrition/tracking apps reviewed    [] taught to use carbohydrate to insulin ratio and insulin sensitivity factor    [] discussed barriers and overcoming barriers to best achieve meal planning goals    [] discussed Mediterranean diet/DASH diet, as appropriate, to address lipids or BP   [] reviewed renal diet components and how to incorporate this with diabetes meal planning     Education on Increased Physical Activity:  discussed how  increased physical activity improves insulin resistance, blood glucose control, and heart health    Monitoring  diet modification/understanding, blood sugars, hemoglobin A1c, and weight trends    Evaluation  Behavioral Goal(s) selected:   Healthy Eating and Being Active    Support Plan:  The use of Diabetes Websites or Apps    Plan  Blood sugar goals: less than 130 mg/dl in the morning and less than 180 mg/dl 2 hours after meals.  Keep glucose tabs or fast acting carbohydrates with you for treatment of lows; for blood glucose levels less than 70 mg/dl, take 15 grams of fast acting carbohydrates (3-4 glucose tabs, 4 ounces of juice, or as discussed). Retest in 15 min. If not above 70 mg/dl, retreat.   Call Abimbola Harrell RD at [phone] (option 3) for problems/concerns.   No follow-ups on file.    Abimbola Harrell RD           [1]   Current Outpatient Medications:     tadalafil 5 MG Oral Tab, Take 1 tablet (5 mg total) by mouth daily as needed for Erectile Dysfunction., Disp: 20 tablet, Rfl: 1    rosuvastatin 5 MG Oral Tab, , Disp: , Rfl:     Tirzepatide (MOUNJARO) 12.5 MG/0.5ML Subcutaneous Solution Auto-injector, Inject 12.5 mg into the skin once a week. For 4 weeks., Disp: 2 mL, Rfl: 1    Tirzepatide (MOUNJARO) 15 MG/0.5ML Subcutaneous Solution Auto-injector, Inject 15 mg into the skin once a week., Disp: 2 mL, Rfl: 1    METFORMIN  MG Oral Tablet 24 Hr, TAKE 2 TABLETS(1000 MG) BY MOUTH TWICE DAILY WITH MEALS (Patient not taking: Reported on 4/9/2025), Disp: 360 tablet, Rfl: 0    Tirzepatide (MOUNJARO) 10 MG/0.5ML Subcutaneous Solution Auto-injector, Inject 10 mg into the skin once a week. IF DOING WELL AFTER 4 DOSES INCREASE TO 12.5MG, Disp: 2 mL, Rfl: 1    Telmisartan 20 MG Oral Tab, Take 1 tablet (20 mg total) by mouth at bedtime. FOR BLOOD PRESSURE. STOP LOSARTAN., Disp: 90 tablet, Rfl: 5    empagliflozin (JARDIANCE) 25 MG Oral Tab, Take 1 tablet (25 mg total) by mouth daily. FOR DIABETES., Disp: 90  tablet, Rfl: 9    fluticasone-salmeterol (ADVAIR DISKUS) 250-50 MCG/ACT Inhalation Aerosol Powder, Breath Activated, Inhale 1 puff into the lungs 2 (two) times daily. (Patient not taking: Reported on 4/9/2025), Disp: 3 each, Rfl: 6    methylPREDNISolone (MEDROL) 4 MG Oral Tablet Therapy Pack, As directed if symptoms fail to improve despite inhaler. (Patient not taking: Reported on 4/9/2025), Disp: 1 each, Rfl: 0  [2]   Current Outpatient Medications:     tadalafil 5 MG Oral Tab, Take 1 tablet (5 mg total) by mouth daily as needed for Erectile Dysfunction., Disp: 20 tablet, Rfl: 1    rosuvastatin 5 MG Oral Tab, , Disp: , Rfl:     Tirzepatide (MOUNJARO) 12.5 MG/0.5ML Subcutaneous Solution Auto-injector, Inject 12.5 mg into the skin once a week. For 4 weeks., Disp: 2 mL, Rfl: 1    Tirzepatide (MOUNJARO) 15 MG/0.5ML Subcutaneous Solution Auto-injector, Inject 15 mg into the skin once a week., Disp: 2 mL, Rfl: 1    METFORMIN  MG Oral Tablet 24 Hr, TAKE 2 TABLETS(1000 MG) BY MOUTH TWICE DAILY WITH MEALS (Patient not taking: Reported on 4/9/2025), Disp: 360 tablet, Rfl: 0    Tirzepatide (MOUNJARO) 10 MG/0.5ML Subcutaneous Solution Auto-injector, Inject 10 mg into the skin once a week. IF DOING WELL AFTER 4 DOSES INCREASE TO 12.5MG, Disp: 2 mL, Rfl: 1    Telmisartan 20 MG Oral Tab, Take 1 tablet (20 mg total) by mouth at bedtime. FOR BLOOD PRESSURE. STOP LOSARTAN., Disp: 90 tablet, Rfl: 5    empagliflozin (JARDIANCE) 25 MG Oral Tab, Take 1 tablet (25 mg total) by mouth daily. FOR DIABETES., Disp: 90 tablet, Rfl: 9    fluticasone-salmeterol (ADVAIR DISKUS) 250-50 MCG/ACT Inhalation Aerosol Powder, Breath Activated, Inhale 1 puff into the lungs 2 (two) times daily. (Patient not taking: Reported on 4/9/2025), Disp: 3 each, Rfl: 6    methylPREDNISolone (MEDROL) 4 MG Oral Tablet Therapy Pack, As directed if symptoms fail to improve despite inhaler. (Patient not taking: Reported on 4/9/2025), Disp: 1 each, Rfl: 0

## 2025-04-28 NOTE — PATIENT INSTRUCTIONS
Provide 3 days of food logs at next visit - either on paper or [employer] (phone dinorah)  Keep g7 dinorah open in the background, look at sugar if feeling shaky/sweaty/nauseated to assess whether sugars are low; keep rapid acting sugar with you (small pack of skittles, 4 ounces juice)  Aim for 3 meals per day - even if just an apple or toast with PB or protein shake for breakfast    Asked NP for foot exam next visit

## 2025-07-14 DIAGNOSIS — E11.9 TYPE 2 DIABETES MELLITUS WITHOUT COMPLICATION, WITHOUT LONG-TERM CURRENT USE OF INSULIN (HCC): ICD-10-CM

## 2025-07-19 RX ORDER — TIRZEPATIDE 15 MG/.5ML
15 INJECTION, SOLUTION SUBCUTANEOUS WEEKLY
Qty: 2 ML | Refills: 1 | Status: SHIPPED | OUTPATIENT
Start: 2025-07-19 | End: 2025-07-21

## 2025-07-19 RX ORDER — TIRZEPATIDE 15 MG/.5ML
INJECTION, SOLUTION SUBCUTANEOUS
Qty: 2 ML | Refills: 1 | OUTPATIENT
Start: 2025-07-19

## 2025-07-21 ENCOUNTER — OFFICE VISIT (OUTPATIENT)
Facility: LOCATION | Age: 30
End: 2025-07-21
Payer: COMMERCIAL

## 2025-07-21 VITALS
SYSTOLIC BLOOD PRESSURE: 111 MMHG | BODY MASS INDEX: 32.47 KG/M2 | HEART RATE: 91 BPM | DIASTOLIC BLOOD PRESSURE: 67 MMHG | OXYGEN SATURATION: 99 % | RESPIRATION RATE: 18 BRPM | WEIGHT: 253 LBS | HEIGHT: 74 IN

## 2025-07-21 DIAGNOSIS — I15.2 HYPERTENSION ASSOCIATED WITH TYPE 2 DIABETES MELLITUS (HCC): Primary | ICD-10-CM

## 2025-07-21 DIAGNOSIS — E11.69 HYPERLIPIDEMIA ASSOCIATED WITH TYPE 2 DIABETES MELLITUS (HCC): ICD-10-CM

## 2025-07-21 DIAGNOSIS — E11.59 HYPERTENSION ASSOCIATED WITH TYPE 2 DIABETES MELLITUS (HCC): Primary | ICD-10-CM

## 2025-07-21 DIAGNOSIS — E78.5 HYPERLIPIDEMIA ASSOCIATED WITH TYPE 2 DIABETES MELLITUS (HCC): ICD-10-CM

## 2025-07-21 DIAGNOSIS — Z31.89 ENCOUNTER FOR FERTILITY PLANNING: ICD-10-CM

## 2025-07-21 LAB — HEMOGLOBIN A1C: 5.1 % (ref 4.3–5.6)

## 2025-07-21 PROCEDURE — 99214 OFFICE O/P EST MOD 30 MIN: CPT | Performed by: NURSE PRACTITIONER

## 2025-07-21 PROCEDURE — 3044F HG A1C LEVEL LT 7.0%: CPT | Performed by: NURSE PRACTITIONER

## 2025-07-21 PROCEDURE — 3008F BODY MASS INDEX DOCD: CPT | Performed by: NURSE PRACTITIONER

## 2025-07-21 PROCEDURE — 3074F SYST BP LT 130 MM HG: CPT | Performed by: NURSE PRACTITIONER

## 2025-07-21 PROCEDURE — 83036 HEMOGLOBIN GLYCOSYLATED A1C: CPT | Performed by: NURSE PRACTITIONER

## 2025-07-21 PROCEDURE — 3078F DIAST BP <80 MM HG: CPT | Performed by: NURSE PRACTITIONER

## 2025-07-21 RX ORDER — TIRZEPATIDE 15 MG/.5ML
15 INJECTION, SOLUTION SUBCUTANEOUS WEEKLY
Qty: 2 ML | Refills: 5 | Status: SHIPPED | OUTPATIENT
Start: 2025-07-21

## 2025-07-21 RX ORDER — ROSUVASTATIN CALCIUM 5 MG/1
5 TABLET, COATED ORAL NIGHTLY
Qty: 90 TABLET | Refills: 1 | Status: SHIPPED | OUTPATIENT
Start: 2025-07-21

## 2025-07-21 RX ORDER — TELMISARTAN 20 MG/1
20 TABLET ORAL NIGHTLY
Qty: 90 TABLET | Refills: 1 | Status: SHIPPED | OUTPATIENT
Start: 2025-07-21

## 2025-07-21 NOTE — PROGRESS NOTES
OFFICE NOTE       The following individual(s) verbally consented to be recorded using ambient AI listening technology and understand that they can each withdraw their consent to this listening technology at any point by asking the clinician to turn off or pause the recording:    Patient name: Deirdre Lewis  Additional names:  none       Patient ID: Deirdre Lewis is a 30 year old male.  Today's Date: 07/21/25  Chief Complaint: Diabetes (A1C Check )    HPI  History of Present Illness  Deirdre Lewis is a 30 year old male with type 2 diabetes who presents for follow-up of glycemic control.    He has been managing type 2 diabetes with Mounjaro, achieving an A1c of 5.1. He previously discontinued metformin and Jardiance in April, which improved gastrointestinal symptoms, specifically diarrhea. No current side effects from Mounjaro after initial nausea and constipation when starting the medication.    He is taking rosuvastatin for cholesterol management and telmisartan for blood pressure control. He is interested in obtaining a continuous glucose monitor again, as it helped him become more aware of his dietary habits.    He has a history of high ferritin levels and was referred to hematology but was unable to attend the appointment due to a family bereavement. He has seen a dietitian and found the experience helpful in managing his diabetes.    He is trying to conceive with his partner and is interested in undergoing a semen analysis to ensure there are no issues on his part.       Vitals:    07/21/25 0705   BP: 111/67   Pulse: 91   Resp: 18   SpO2: 99%   Weight: 253 lb (114.8 kg)   Height: 6' 2\" (1.88 m)     body mass index is 32.48 kg/m².  BP Readings from Last 3 Encounters:   07/21/25 111/67   04/09/25 111/74   02/17/25 149/81     The ASCVD Risk score (Juventino JAIN, et al., 2019) failed to calculate for the following reasons:    The 2019 ASCVD risk score is only valid for ages 40 to 79  Results  LABS    Hemoglobin A1c:  5.1%       Medications reviewed:  Current Medications[1]      Assessment & Plan    1. Hypertension associated with type 2 diabetes mellitus (HCC) (Primary)  -     POC Hemoglobin A1C  -     Telmisartan; Take 1 tablet (20 mg total) by mouth at bedtime. FOR BLOOD PRESSURE.  Dispense: 90 tablet; Refill: 1  -     Mounjaro; Inject 15 mg into the skin once a week.  Dispense: 2 mL; Refill: 5  2. Hyperlipidemia associated with type 2 diabetes mellitus (HCC)  -     POC Hemoglobin A1C  -     Rosuvastatin Calcium; Take 1 tablet (5 mg total) by mouth nightly.  Dispense: 90 tablet; Refill: 1  -     Mounjaro; Inject 15 mg into the skin once a week.  Dispense: 2 mL; Refill: 5  3. Encounter for fertility planning    Assessment & Plan  Type 2 diabetes mellitus without complications  Excellent glycemic control with A1c at 5.1% using Mounjaro 15 mg. No significant side effects from Mounjaro after initial adjustment period.  - Continue Mounjaro 15 mg subcutaneously once a week.  - Follow up with diabetic educator for continuous glucose monitoring system.  - Monitor A1c, chemistry, liver, kidney, electrolytes, and CBC in six months.    Hypertension  Blood pressure well-controlled at 111/67 mmHg with current medication regimen.  - Continue Telmisartan 20 mg oral at bedtime.  - Refill Telmisartan prescription.    Elevated ferritin  Previous high ferritin levels noted. Referral to hematology was made but not yet attended due to personal circumstances.  - Follow up with hematology for evaluation of elevated ferritin.    Fertility evaluation  Inquired about fertility evaluation as he and his partner are trying to conceive.  - Coordinate with partner's healthcare provider regarding fertility evaluation.  - Order semen analysis if needed after consultation with partner's healthcare provider.    Hyperlipidemia associated with type 2 diabetes mellitus  Continued management with rosuvastatin.  - Continue Rosuvastatin 5 mg oral.  - Refill  Rosuvastatin prescription.       Follow Up: As needed/if symptoms worsen or Return in about 6 months (around 2026) for CHRONIC CARE MANAGEMENT, WE WILL GET LABS AFTER YOUR VISIT, ANNUAL EXAM..     I spent 30 minutes obtaining pertitent medical history, reviewing pertinent imaging/labs and specialists notes, evaluating patient, discussing differential diagnosis' and various treatment options, reinforcing importance of compliance with treatment plan, and completing documentation.     Encounter Times  PreChartin minutes    Reviewing/Obtainin minutes      Medical Exam: 4 minutes    Plan: 5 minutes      Notes: 5 minutes    Counseling/Education: 6 minutes      Referring/Communicating:   minutes    Ind Interpretation:   minutes      Care Coordination:   minutes       My total time spent caring for the patient on the day of the encounter: 30 minutes.         Objective/ Results:   Physical Exam   Physical Exam  VITALS: BP- 111/67  GENERAL: Alert, cooperative, well developed, no acute distress  HEENT: Normocephalic, normal oropharynx, moist mucous membranes  CHEST: Clear to auscultation bilaterally, no wheezes, rhonchi, or crackles  CARDIOVASCULAR: Normal heart rate and rhythm, S1 and S2 normal without murmurs  ABDOMEN: Soft, non-tender, non-distended, without organomegaly, normal bowel sounds  EXTREMITIES: No cyanosis or edema  NEUROLOGICAL: Cranial nerves grossly intact, moves all extremities without gross motor or sensory deficit     Reviewed:    Patient Active Problem List    Diagnosis    Type 2 diabetes mellitus without complication, without long-term current use of insulin (HCC)    Hypertension associated with type 2 diabetes mellitus (HCC)    Hyperlipidemia associated with type 2 diabetes mellitus (HCC)    Moderate persistent asthma without complication (HCC)    Statin medication declined by patient    Myalgia    Elevated ferritin      Allergies[2]   Short Social Hx on File[3]   Review of Systems    All  other systems negative unless otherwise stated in ROS or HPI above.       VEDA Paz  Internal Medicine       Call office with any questions or seek emergency care if necessary.   Patient understands and agrees to follow directions and advice.      ----------------------------------------- PATIENT INSTRUCTIONS-----------------------------------------     Patient Instructions   VISIT SUMMARY:  Today, we reviewed your management of type 2 diabetes, hypertension, and hyperlipidemia. Your diabetes is well-controlled with your current medication, and your blood pressure and cholesterol levels are also well-managed. We discussed your interest in a continuous glucose monitor and addressed your elevated ferritin levels and fertility concerns.    YOUR PLAN:  -TYPE 2 DIABETES MELLITUS: Type 2 diabetes is a condition where your body does not use insulin properly, leading to high blood sugar levels. Your A1c is excellent at 5.1% with Mounjaro 15 mg. Continue taking Mounjaro 15 mg subcutaneously once a week. We will arrange a follow-up with a diabetic educator for a continuous glucose monitoring system. We will monitor your A1c, chemistry, liver, kidney, electrolytes, and CBC in six months.    -HYPERTENSION: Hypertension is high blood pressure. Your blood pressure is well-controlled at 111/67 mmHg with Telmisartan. Continue taking Telmisartan 20 mg orally at bedtime. Your prescription will be refilled.    -ELEVATED FERRITIN: Elevated ferritin levels can indicate various conditions, including inflammation or iron overload. You were previously referred to hematology but could not attend. Please follow up with hematology for further evaluation.    -FERTILITY EVALUATION: You are interested in a fertility evaluation as you and your partner are trying to conceive. Coordinate with your partner's healthcare provider regarding the evaluation. We can order a semen analysis if needed after consultation.    -HYPERLIPIDEMIA:  Hyperlipidemia is high cholesterol levels in the blood, which can increase the risk of heart disease. Continue taking Rosuvastatin 5 mg orally. Your prescription will be refilled.    INSTRUCTIONS:  Please follow up with a diabetic educator for a continuous glucose monitoring system. Schedule an appointment with hematology for your elevated ferritin levels. Coordinate with your partner's healthcare provider regarding fertility evaluation, and we can order a semen analysis if needed. We will monitor your A1c, chemistry, liver, kidney, electrolytes, and CBC in six months.    Contains text generated by [employer]          The 21st Century Cures Act makes medical notes available to patients in the interest of transparency.  However, please be advised that this is a medical document.  It is intended as a peer to peer communication.  It is written in medical language and may contain abbreviations or verbiage that are technical and unfamiliar.  It may appear blunt or direct.  Medical documents are intended to carry relevant information, facts as evident, and the clinical opinion of the practitioner.            [1]   Current Outpatient Medications   Medication Sig Dispense Refill    rosuvastatin 5 MG Oral Tab Take 1 tablet (5 mg total) by mouth nightly. 90 tablet 1    Telmisartan 20 MG Oral Tab Take 1 tablet (20 mg total) by mouth at bedtime. FOR BLOOD PRESSURE. 90 tablet 1    Tirzepatide (MOUNJARO) 15 MG/0.5ML Subcutaneous Solution Auto-injector Inject 15 mg into the skin once a week. 2 mL 5    tadalafil 5 MG Oral Tab Take 1 tablet (5 mg total) by mouth daily as needed for Erectile Dysfunction. (Patient not taking: Reported on 7/21/2025) 20 tablet 1    METFORMIN  MG Oral Tablet 24 Hr TAKE 2 TABLETS(1000 MG) BY MOUTH TWICE DAILY WITH MEALS (Patient not taking: Reported on 7/21/2025) 360 tablet 0    empagliflozin (JARDIANCE) 25 MG Oral Tab Take 1 tablet (25 mg total) by mouth daily. FOR DIABETES. (Patient not taking:  Reported on 7/21/2025) 90 tablet 9    fluticasone-salmeterol (ADVAIR DISKUS) 250-50 MCG/ACT Inhalation Aerosol Powder, Breath Activated Inhale 1 puff into the lungs 2 (two) times daily. (Patient not taking: Reported on 7/21/2025) 3 each 6   [2]   Allergies  Allergen Reactions    Dander HIVES   [3]   Social History  Socioeconomic History    Marital status:    Tobacco Use    Smoking status: Never    Smokeless tobacco: Never   Vaping Use    Vaping status: Never Used   Substance and Sexual Activity    Alcohol use: Not Currently    Drug use: Never     Social Drivers of Health     Food Insecurity: No Food Insecurity (2/17/2025)    NCSS - Food Insecurity     Worried About Running Out of Food in the Last Year: No     Ran Out of Food in the Last Year: No   Transportation Needs: No Transportation Needs (2/17/2025)    NCSS - Transportation     Lack of Transportation: No   Stress: Not on File (4/2/2024)    Received from [facility]    Stress     Stress: 0   Housing Stability: Not At Risk (2/17/2025)    NCSS - Housing/Utilities     Has Housing: Yes     Worried About Losing Housing: No     Unable to Get Utilities: No

## 2025-07-21 NOTE — PATIENT INSTRUCTIONS
VISIT SUMMARY:  Today, we reviewed your management of type 2 diabetes, hypertension, and hyperlipidemia. Your diabetes is well-controlled with your current medication, and your blood pressure and cholesterol levels are also well-managed. We discussed your interest in a continuous glucose monitor and addressed your elevated ferritin levels and fertility concerns.    YOUR PLAN:  -TYPE 2 DIABETES MELLITUS: Type 2 diabetes is a condition where your body does not use insulin properly, leading to high blood sugar levels. Your A1c is excellent at 5.1% with Mounjaro 15 mg. Continue taking Mounjaro 15 mg subcutaneously once a week. We will arrange a follow-up with a diabetic educator for a continuous glucose monitoring system. We will monitor your A1c, chemistry, liver, kidney, electrolytes, and CBC in six months.    -HYPERTENSION: Hypertension is high blood pressure. Your blood pressure is well-controlled at 111/67 mmHg with Telmisartan. Continue taking Telmisartan 20 mg orally at bedtime. Your prescription will be refilled.    -ELEVATED FERRITIN: Elevated ferritin levels can indicate various conditions, including inflammation or iron overload. You were previously referred to hematology but could not attend. Please follow up with hematology for further evaluation.    -FERTILITY EVALUATION: You are interested in a fertility evaluation as you and your partner are trying to conceive. Coordinate with your partner's healthcare provider regarding the evaluation. We can order a semen analysis if needed after consultation.    -HYPERLIPIDEMIA: Hyperlipidemia is high cholesterol levels in the blood, which can increase the risk of heart disease. Continue taking Rosuvastatin 5 mg orally. Your prescription will be refilled.    INSTRUCTIONS:  Please follow up with a diabetic educator for a continuous glucose monitoring system. Schedule an appointment with hematology for your elevated ferritin levels. Coordinate with your partner's  healthcare provider regarding fertility evaluation, and we can order a semen analysis if needed. We will monitor your A1c, chemistry, liver, kidney, electrolytes, and CBC in six months.    Contains text generated by Katja